# Patient Record
Sex: FEMALE | Race: WHITE | NOT HISPANIC OR LATINO | Employment: OTHER | URBAN - METROPOLITAN AREA
[De-identification: names, ages, dates, MRNs, and addresses within clinical notes are randomized per-mention and may not be internally consistent; named-entity substitution may affect disease eponyms.]

---

## 2023-03-09 ENCOUNTER — OFFICE VISIT (OUTPATIENT)
Dept: FAMILY MEDICINE | Facility: CLINIC | Age: 73
End: 2023-03-09
Payer: MEDICARE

## 2023-03-09 VITALS
HEART RATE: 80 BPM | SYSTOLIC BLOOD PRESSURE: 138 MMHG | WEIGHT: 145.19 LBS | HEIGHT: 64 IN | OXYGEN SATURATION: 96 % | DIASTOLIC BLOOD PRESSURE: 80 MMHG | BODY MASS INDEX: 24.79 KG/M2

## 2023-03-09 DIAGNOSIS — I10 PRIMARY HYPERTENSION: ICD-10-CM

## 2023-03-09 PROCEDURE — 99999 PR PBB SHADOW E&M-NEW PATIENT-LVL III: ICD-10-PCS | Mod: PBBFAC,,, | Performed by: FAMILY MEDICINE

## 2023-03-09 PROCEDURE — 99203 PR OFFICE/OUTPT VISIT, NEW, LEVL III, 30-44 MIN: ICD-10-PCS | Mod: S$PBB,,, | Performed by: FAMILY MEDICINE

## 2023-03-09 PROCEDURE — 99203 OFFICE O/P NEW LOW 30 MIN: CPT | Mod: PBBFAC,PN | Performed by: FAMILY MEDICINE

## 2023-03-09 PROCEDURE — 99999 PR PBB SHADOW E&M-NEW PATIENT-LVL III: CPT | Mod: PBBFAC,,, | Performed by: FAMILY MEDICINE

## 2023-03-09 PROCEDURE — 99203 OFFICE O/P NEW LOW 30 MIN: CPT | Mod: S$PBB,,, | Performed by: FAMILY MEDICINE

## 2023-03-09 RX ORDER — LOSARTAN POTASSIUM 25 MG/1
25 TABLET ORAL DAILY
Qty: 90 TABLET | Refills: 3 | Status: SHIPPED | OUTPATIENT
Start: 2023-03-09 | End: 2024-01-12 | Stop reason: SDUPTHER

## 2023-03-09 RX ORDER — LOSARTAN POTASSIUM 25 MG/1
25 TABLET ORAL DAILY
COMMUNITY
End: 2023-03-09 | Stop reason: SDUPTHER

## 2023-03-09 NOTE — PATIENT INSTRUCTIONS
Doing well today  Filled blood pressure medication today    Follow up 6 months, will get bloodwork at next visit

## 2023-03-09 NOTE — PROGRESS NOTES
"  Family Medicine Note  [facility]    Chief Complaint   Patient presents with    Establish Care        HPI:  72 female here to establish care.  Has white coat syndrome - so BP elevated today    Finds CBD helps both with RA and anxiety    Declines screening parameters for colon and breast    ROS: as above    Vitals:    03/09/23 1359 03/09/23 1427   BP: (!) 145/80 138/80   BP Location: Right arm    Patient Position: Sitting    BP Method: Medium (Manual)    Pulse: 80    SpO2: 96%    Weight: 65.9 kg (145 lb 2.8 oz)    Height: 5' 4" (1.626 m)       Body mass index is 24.92 kg/m².      General:  AOx3, well nourished and developed in no acute distress  Eyes:  PERRLA, EOMI, vision intact grossly  ENT:  normal hearing, moist oral mucosa  Neck:  trachea midline with no masses or thyromegaly  Heart:  RRR, no murmurs.  No edema noted, extremities warm and well perfused  Lungs:  clear to auscultation bilaterally with symmetric chest movement  Abdomen:  Soft, nontender, nondistended.  Normal bowel sounds  Musculoskeletal:  Normal gait.  Normal posture.  Normal muscular development with no joint swelling.  Neurological:  CN II-XII grossly intact. Symmetric strength and sensation  Psych:  Normal mood and affect.  Able to demonstrate good judgement and personal insight.      Assessment/Plan:    Primary hypertension    Other orders  -     losartan (COZAAR) 25 MG tablet; Take 1 tablet (25 mg total) by mouth once daily.  Dispense: 90 tablet; Refill: 3       Filled medication today.  Will get bloodwork at next visit      "

## 2023-03-16 DIAGNOSIS — I10 PRIMARY HYPERTENSION: ICD-10-CM

## 2023-03-17 ENCOUNTER — PATIENT MESSAGE (OUTPATIENT)
Dept: ADMINISTRATIVE | Facility: HOSPITAL | Age: 73
End: 2023-03-17
Payer: MEDICARE

## 2023-08-01 ENCOUNTER — PATIENT MESSAGE (OUTPATIENT)
Dept: ADMINISTRATIVE | Facility: HOSPITAL | Age: 73
End: 2023-08-01
Payer: MEDICARE

## 2023-09-11 ENCOUNTER — LAB VISIT (OUTPATIENT)
Dept: LAB | Facility: HOSPITAL | Age: 73
End: 2023-09-11
Attending: FAMILY MEDICINE
Payer: MEDICARE

## 2023-09-11 DIAGNOSIS — I10 PRIMARY HYPERTENSION: ICD-10-CM

## 2023-09-11 LAB
ALBUMIN SERPL BCP-MCNC: 4 G/DL (ref 3.5–5.2)
ALP SERPL-CCNC: 93 U/L (ref 55–135)
ALT SERPL W/O P-5'-P-CCNC: 34 U/L (ref 10–44)
ANION GAP SERPL CALC-SCNC: 8 MMOL/L (ref 8–16)
AST SERPL-CCNC: 23 U/L (ref 10–40)
BILIRUB SERPL-MCNC: 0.6 MG/DL (ref 0.1–1)
BUN SERPL-MCNC: 9 MG/DL (ref 8–23)
CALCIUM SERPL-MCNC: 9.8 MG/DL (ref 8.7–10.5)
CHLORIDE SERPL-SCNC: 108 MMOL/L (ref 95–110)
CO2 SERPL-SCNC: 26 MMOL/L (ref 23–29)
CREAT SERPL-MCNC: 0.8 MG/DL (ref 0.5–1.4)
EST. GFR  (NO RACE VARIABLE): >60 ML/MIN/1.73 M^2
GLUCOSE SERPL-MCNC: 96 MG/DL (ref 70–110)
POTASSIUM SERPL-SCNC: 3.6 MMOL/L (ref 3.5–5.1)
PROT SERPL-MCNC: 7.5 G/DL (ref 6–8.4)
SODIUM SERPL-SCNC: 142 MMOL/L (ref 136–145)

## 2023-09-11 PROCEDURE — 36415 COLL VENOUS BLD VENIPUNCTURE: CPT | Performed by: FAMILY MEDICINE

## 2023-09-11 PROCEDURE — 80053 COMPREHEN METABOLIC PANEL: CPT | Performed by: FAMILY MEDICINE

## 2023-09-18 ENCOUNTER — OFFICE VISIT (OUTPATIENT)
Dept: FAMILY MEDICINE | Facility: CLINIC | Age: 73
End: 2023-09-18
Payer: MEDICARE

## 2023-09-18 VITALS
HEIGHT: 64 IN | SYSTOLIC BLOOD PRESSURE: 136 MMHG | DIASTOLIC BLOOD PRESSURE: 70 MMHG | HEART RATE: 67 BPM | BODY MASS INDEX: 24.14 KG/M2 | WEIGHT: 141.38 LBS | OXYGEN SATURATION: 98 %

## 2023-09-18 DIAGNOSIS — I10 PRIMARY HYPERTENSION: Primary | ICD-10-CM

## 2023-09-18 PROCEDURE — 99213 OFFICE O/P EST LOW 20 MIN: CPT | Mod: S$GLB,,, | Performed by: FAMILY MEDICINE

## 2023-09-18 PROCEDURE — 99213 PR OFFICE/OUTPT VISIT, EST, LEVL III, 20-29 MIN: ICD-10-PCS | Mod: S$GLB,,, | Performed by: FAMILY MEDICINE

## 2023-09-18 NOTE — PROGRESS NOTES
"    [facility] [place]    Family Medicine Office Visit    Chief Complaint   Patient presents with    Follow-up        HPI:  follow up chronic conditions:    Blood Pressure at goal on medication, with patient reporting prescription compliance  As documented last visit, declines both colon and breast cancer screening    Is dealing with loss of  (49 year marriage)    ROS: as above    Vitals:    09/18/23 1154   BP: 136/70   Pulse: 67   SpO2: 98%   Weight: 64.1 kg (141 lb 6.4 oz)   Height: 5' 4" (1.626 m)      Body mass index is 24.27 kg/m².      General:  AOx3, well nourished and developed in no acute distress  Eyes:  PERRLA, EOMI, vision intact grossly  ENT:  normal hearing, moist oral mucosa  Neck:  trachea midline with no masses or thyromegaly  Heart:  RRR, no murmurs.  No edema noted, extremities warm and well perfused  Lungs:  clear to auscultation bilaterally with symmetric chest movement  Abdomen:  Soft, nontender, nondistended.  Normal bowel sounds  Musculoskeletal:  Normal gait.  Normal posture.  Normal muscular development with no joint swelling.  Neurological:  CN II-XII grossly intact. Symmetric strength and sensation  Psych:  Normal mood and affect.  Able to demonstrate good judgement and personal insight.      Assessment/Plan:    1. Primary hypertension       At goal.  Follow up 6 months.      "

## 2023-09-21 DIAGNOSIS — Z78.0 MENOPAUSE: ICD-10-CM

## 2023-10-24 ENCOUNTER — PATIENT MESSAGE (OUTPATIENT)
Dept: ADMINISTRATIVE | Facility: HOSPITAL | Age: 73
End: 2023-10-24
Payer: MEDICARE

## 2024-01-05 ENCOUNTER — PATIENT MESSAGE (OUTPATIENT)
Dept: ADMINISTRATIVE | Facility: HOSPITAL | Age: 74
End: 2024-01-05
Payer: MEDICARE

## 2024-01-12 ENCOUNTER — LAB VISIT (OUTPATIENT)
Dept: LAB | Facility: CLINIC | Age: 74
End: 2024-01-12
Payer: MEDICARE

## 2024-01-12 ENCOUNTER — OFFICE VISIT (OUTPATIENT)
Dept: FAMILY MEDICINE | Facility: CLINIC | Age: 74
End: 2024-01-12
Payer: MEDICARE

## 2024-01-12 VITALS
HEART RATE: 78 BPM | WEIGHT: 145.5 LBS | DIASTOLIC BLOOD PRESSURE: 90 MMHG | OXYGEN SATURATION: 97 % | BODY MASS INDEX: 25.78 KG/M2 | SYSTOLIC BLOOD PRESSURE: 170 MMHG | HEIGHT: 63 IN

## 2024-01-12 DIAGNOSIS — E78.2 MIXED HYPERLIPIDEMIA: ICD-10-CM

## 2024-01-12 DIAGNOSIS — I10 ESSENTIAL HYPERTENSION, BENIGN: ICD-10-CM

## 2024-01-12 DIAGNOSIS — R73.9 ELEVATED BLOOD SUGAR: ICD-10-CM

## 2024-01-12 DIAGNOSIS — R53.83 FATIGUE, UNSPECIFIED TYPE: ICD-10-CM

## 2024-01-12 DIAGNOSIS — M06.09 RHEUMATOID ARTHRITIS OF MULTIPLE SITES WITH NEGATIVE RHEUMATOID FACTOR: ICD-10-CM

## 2024-01-12 DIAGNOSIS — M06.9 RHEUMATOID ARTHRITIS, INVOLVING UNSPECIFIED SITE, UNSPECIFIED WHETHER RHEUMATOID FACTOR PRESENT: ICD-10-CM

## 2024-01-12 DIAGNOSIS — F32.4 MAJOR DEPRESSIVE DISORDER WITH SINGLE EPISODE, IN PARTIAL REMISSION: ICD-10-CM

## 2024-01-12 DIAGNOSIS — M06.9 RHEUMATOID ARTHRITIS, INVOLVING UNSPECIFIED SITE, UNSPECIFIED WHETHER RHEUMATOID FACTOR PRESENT: Primary | ICD-10-CM

## 2024-01-12 LAB
ALBUMIN SERPL BCP-MCNC: 3.9 G/DL (ref 3.5–5.2)
ALBUMIN/CREAT UR: ABNORMAL UG/MG (ref 0–30)
ALP SERPL-CCNC: 106 U/L (ref 55–135)
ALT SERPL W/O P-5'-P-CCNC: 29 U/L (ref 10–44)
ANION GAP SERPL CALC-SCNC: 9 MMOL/L (ref 8–16)
AST SERPL-CCNC: 23 U/L (ref 10–40)
BILIRUB SERPL-MCNC: 0.6 MG/DL (ref 0.1–1)
BUN SERPL-MCNC: 9 MG/DL (ref 8–23)
CALCIUM SERPL-MCNC: 9.6 MG/DL (ref 8.7–10.5)
CHLORIDE SERPL-SCNC: 106 MMOL/L (ref 95–110)
CHOLEST SERPL-MCNC: 186 MG/DL (ref 120–199)
CHOLEST/HDLC SERPL: 4 {RATIO} (ref 2–5)
CO2 SERPL-SCNC: 25 MMOL/L (ref 23–29)
CREAT SERPL-MCNC: 0.8 MG/DL (ref 0.5–1.4)
CREAT UR-MCNC: 12 MG/DL (ref 15–325)
EST. GFR  (NO RACE VARIABLE): >60 ML/MIN/1.73 M^2
ESTIMATED AVG GLUCOSE: 103 MG/DL (ref 68–131)
GLUCOSE SERPL-MCNC: 96 MG/DL (ref 70–110)
HBA1C MFR BLD: 5.2 % (ref 4–5.6)
HDLC SERPL-MCNC: 46 MG/DL (ref 40–75)
HDLC SERPL: 24.7 % (ref 20–50)
LDLC SERPL CALC-MCNC: 121 MG/DL (ref 63–159)
MICROALBUMIN UR DL<=1MG/L-MCNC: <5 UG/ML
NONHDLC SERPL-MCNC: 140 MG/DL
POTASSIUM SERPL-SCNC: 4 MMOL/L (ref 3.5–5.1)
PROT SERPL-MCNC: 7.4 G/DL (ref 6–8.4)
RHEUMATOID FACT SERPL-ACNC: 42 IU/ML (ref 0–15)
SODIUM SERPL-SCNC: 140 MMOL/L (ref 136–145)
TRIGL SERPL-MCNC: 95 MG/DL (ref 30–150)
TSH SERPL DL<=0.005 MIU/L-ACNC: 1.28 UIU/ML (ref 0.4–4)

## 2024-01-12 PROCEDURE — 99214 OFFICE O/P EST MOD 30 MIN: CPT | Mod: S$GLB,,, | Performed by: INTERNAL MEDICINE

## 2024-01-12 PROCEDURE — 86235 NUCLEAR ANTIGEN ANTIBODY: CPT | Mod: 59 | Performed by: INTERNAL MEDICINE

## 2024-01-12 PROCEDURE — 82043 UR ALBUMIN QUANTITATIVE: CPT | Performed by: INTERNAL MEDICINE

## 2024-01-12 PROCEDURE — 83036 HEMOGLOBIN GLYCOSYLATED A1C: CPT | Performed by: INTERNAL MEDICINE

## 2024-01-12 PROCEDURE — 80053 COMPREHEN METABOLIC PANEL: CPT | Performed by: INTERNAL MEDICINE

## 2024-01-12 PROCEDURE — 86039 ANTINUCLEAR ANTIBODIES (ANA): CPT | Performed by: INTERNAL MEDICINE

## 2024-01-12 PROCEDURE — 36415 COLL VENOUS BLD VENIPUNCTURE: CPT | Mod: ,,, | Performed by: INTERNAL MEDICINE

## 2024-01-12 PROCEDURE — 84443 ASSAY THYROID STIM HORMONE: CPT | Performed by: INTERNAL MEDICINE

## 2024-01-12 PROCEDURE — 86038 ANTINUCLEAR ANTIBODIES: CPT | Performed by: INTERNAL MEDICINE

## 2024-01-12 PROCEDURE — 86431 RHEUMATOID FACTOR QUANT: CPT | Performed by: INTERNAL MEDICINE

## 2024-01-12 PROCEDURE — 80061 LIPID PANEL: CPT | Performed by: INTERNAL MEDICINE

## 2024-01-12 RX ORDER — LOSARTAN POTASSIUM 25 MG/1
25 TABLET ORAL 2 TIMES DAILY
Qty: 180 TABLET | Refills: 3 | Status: SHIPPED | OUTPATIENT
Start: 2024-01-12 | End: 2025-01-11

## 2024-01-12 RX ORDER — MELOXICAM 15 MG/1
15 TABLET ORAL
Qty: 30 TABLET | Refills: 2 | Status: SHIPPED | OUTPATIENT
Start: 2024-01-12 | End: 2024-04-11

## 2024-01-12 NOTE — PROGRESS NOTES
Subjective:       Patient ID: Shell Lopez is a 73 y.o. female.    Chief Complaint: Hip Pain (Patient is here for left  hip pain. ) and Hypertension      Patient is established already .......... presents today for a f/u visit , to discuss getting new labs and for management of the chronic conditions aminata BP , joint pains , RA        Hip Pain   The incident occurred more than 1 week ago. The incident occurred at home. There was no injury mechanism. The pain is present in the left hip. The quality of the pain is described as aching. The pain is moderate. The pain has been Fluctuating since onset. Pertinent negatives include no inability to bear weight, loss of motion, loss of sensation, muscle weakness, numbness or tingling. She reports no foreign bodies present. The symptoms are aggravated by movement and weight bearing. She has tried acetaminophen (CBD) for the symptoms. The treatment provided mild relief.   Hypertension      Review of Systems   Constitutional:  Negative for activity change, fever and unexpected weight change.   Respiratory: Negative.     Cardiovascular: Negative.    Musculoskeletal:  Positive for arthralgias and leg pain.   Neurological: Negative.  Negative for tingling and numbness.         Objective:      Physical Exam  Vitals and nursing note reviewed.   Constitutional:       Appearance: Normal appearance.   Cardiovascular:      Rate and Rhythm: Normal rate and regular rhythm.   Pulmonary:      Effort: Pulmonary effort is normal.      Breath sounds: Normal breath sounds.   Musculoskeletal:         General: Normal range of motion.      Cervical back: Normal range of motion and neck supple.   Skin:     General: Skin is warm.   Neurological:      General: No focal deficit present.      Mental Status: She is alert.         Assessment:       1. Rheumatoid arthritis, involving unspecified site, unspecified whether rheumatoid factor present  Overview:  With inc pain in L hip for 2  weeks      Assessment & Plan:  Get ZACHERY , RF  T/c adding NSAIDs    Orders:  -     Rheumatoid Factor; Future; Expected date: 01/12/2024  -     ZACHERY Screen w/Reflex; Future; Expected date: 01/12/2024    2. Essential hypertension, benign  Overview:  Elevated today  Sec to hip pains  Sec to anxiety after losing her spouse last May    Assessment & Plan:  Diet d/w pt  Inc losartan to 25mg bid  Bring monitor to next clement    Orders:  -     Comprehensive Metabolic Panel; Future; Expected date: 01/12/2024  -     Microalbumin/Creatinine Ratio, Urine; Future; Expected date: 01/12/2024  -     TSH; Future; Expected date: 01/12/2024    3. Mixed hyperlipidemia  -     Lipid Panel; Future; Expected date: 01/12/2024    4. Elevated blood sugar  -     Hemoglobin A1C; Standing    5. Fatigue, unspecified type  -     TSH; Future; Expected date: 01/12/2024    6. Rheumatoid arthritis of multiple sites with negative rheumatoid factor  Overview:  With inc pain in L hip for 2 weeks      Assessment & Plan:  Get ZACHERY , RF  T/c adding NSAIDs      Other orders  -     losartan (COZAAR) 25 MG tablet; Take 1 tablet (25 mg total) by mouth 2 (two) times a day.  Dispense: 180 tablet; Refill: 3  -     meloxicam (MOBIC) 15 MG tablet; Take 1 tablet (15 mg total) by mouth as needed for Pain.  Dispense: 30 tablet; Refill: 2           Plan:       1. Rheumatoid arthritis, involving unspecified site, unspecified whether rheumatoid factor present  Overview:  With inc pain in L hip for 2 weeks      Assessment & Plan:  Get ZACHERY , RF  T/c adding NSAIDs    Orders:  -     Rheumatoid Factor; Future; Expected date: 01/12/2024  -     ZACHERY Screen w/Reflex; Future; Expected date: 01/12/2024    2. Essential hypertension, benign  Overview:  Elevated today  Sec to hip pains  Sec to anxiety after losing her spouse last May    Assessment & Plan:  Diet d/w pt  Inc losartan to 25mg bid  Bring monitor to next clement    Orders:  -     Comprehensive Metabolic Panel; Future; Expected date:  01/12/2024  -     Microalbumin/Creatinine Ratio, Urine; Future; Expected date: 01/12/2024  -     TSH; Future; Expected date: 01/12/2024    3. Mixed hyperlipidemia  -     Lipid Panel; Future; Expected date: 01/12/2024    4. Elevated blood sugar  -     Hemoglobin A1C; Standing    5. Fatigue, unspecified type  -     TSH; Future; Expected date: 01/12/2024    6. Rheumatoid arthritis of multiple sites with negative rheumatoid factor  Overview:  With inc pain in L hip for 2 weeks      Assessment & Plan:  Get ZACHERY , RF  T/c adding NSAIDs      Other orders  -     losartan (COZAAR) 25 MG tablet; Take 1 tablet (25 mg total) by mouth 2 (two) times a day.  Dispense: 180 tablet; Refill: 3  -     meloxicam (MOBIC) 15 MG tablet; Take 1 tablet (15 mg total) by mouth as needed for Pain.  Dispense: 30 tablet; Refill: 2        1. Rheumatoid arthritis, involving unspecified site, unspecified whether rheumatoid factor present  Overview:  With inc pain in L hip for 2 weeks      Assessment & Plan:  Get ZACHERY , RF  T/c adding NSAIDs    Orders:  -     Rheumatoid Factor; Future; Expected date: 01/12/2024  -     ZACHERY Screen w/Reflex; Future; Expected date: 01/12/2024    2. Essential hypertension, benign  Overview:  Elevated today  Sec to hip pains  Sec to anxiety after losing her spouse last May    Assessment & Plan:  Diet d/w pt  Inc losartan to 25mg bid  Bring monitor to next clement    Orders:  -     Comprehensive Metabolic Panel; Future; Expected date: 01/12/2024  -     Microalbumin/Creatinine Ratio, Urine; Future; Expected date: 01/12/2024  -     TSH; Future; Expected date: 01/12/2024    3. Mixed hyperlipidemia  -     Lipid Panel; Future; Expected date: 01/12/2024    4. Elevated blood sugar  -     Hemoglobin A1C; Standing    5. Fatigue, unspecified type  -     TSH; Future; Expected date: 01/12/2024    6. Rheumatoid arthritis of multiple sites with negative rheumatoid factor  Overview:  With inc pain in L hip for 2 weeks      Assessment &  Plan:  Get ZACHERY , RF  T/c adding NSAIDs      7. Major depressive disorder with single episode, in partial remission  Overview:  Stable  Sec to spouse passing away in 23'    Assessment & Plan:  Monitor  T/c adding an SSRI next       Other orders  -     losartan (COZAAR) 25 MG tablet; Take 1 tablet (25 mg total) by mouth 2 (two) times a day.  Dispense: 180 tablet; Refill: 3  -     meloxicam (MOBIC) 15 MG tablet; Take 1 tablet (15 mg total) by mouth as needed for Pain.  Dispense: 30 tablet; Refill: 2

## 2024-01-16 LAB
ANA PATTERN 1: NORMAL
ANA SER QL IF: POSITIVE
ANA TITR SER IF: NORMAL {TITER}

## 2024-01-18 LAB
ANTI SM ANTIBODY: 0.09 RATIO (ref 0–0.99)
ANTI SM/RNP ANTIBODY: 0.09 RATIO (ref 0–0.99)
ANTI-SM INTERPRETATION: NEGATIVE
ANTI-SM/RNP INTERPRETATION: NEGATIVE
ANTI-SSA ANTIBODY: 0.07 RATIO (ref 0–0.99)
ANTI-SSA INTERPRETATION: NEGATIVE
ANTI-SSB ANTIBODY: 0.07 RATIO (ref 0–0.99)
ANTI-SSB INTERPRETATION: NEGATIVE
DSDNA AB SER-ACNC: NORMAL [IU]/ML

## 2024-02-06 DIAGNOSIS — Z12.11 COLON CANCER SCREENING: ICD-10-CM

## 2024-02-09 ENCOUNTER — OFFICE VISIT (OUTPATIENT)
Dept: FAMILY MEDICINE | Facility: CLINIC | Age: 74
End: 2024-02-09
Payer: MEDICARE

## 2024-02-09 VITALS
OXYGEN SATURATION: 98 % | HEART RATE: 77 BPM | WEIGHT: 140 LBS | DIASTOLIC BLOOD PRESSURE: 81 MMHG | SYSTOLIC BLOOD PRESSURE: 141 MMHG | HEIGHT: 63 IN | BODY MASS INDEX: 24.8 KG/M2

## 2024-02-09 DIAGNOSIS — M06.09 RHEUMATOID ARTHRITIS OF MULTIPLE SITES WITH NEGATIVE RHEUMATOID FACTOR: ICD-10-CM

## 2024-02-09 DIAGNOSIS — F32.4 MAJOR DEPRESSIVE DISORDER WITH SINGLE EPISODE, IN PARTIAL REMISSION: ICD-10-CM

## 2024-02-09 DIAGNOSIS — R76.8 ANA POSITIVE: Primary | ICD-10-CM

## 2024-02-09 PROCEDURE — 99213 OFFICE O/P EST LOW 20 MIN: CPT | Mod: S$GLB,,, | Performed by: INTERNAL MEDICINE

## 2024-02-09 RX ORDER — ESCITALOPRAM OXALATE 5 MG/1
5 TABLET ORAL DAILY
Qty: 30 TABLET | Refills: 2 | Status: SHIPPED | OUTPATIENT
Start: 2024-02-09 | End: 2024-03-18

## 2024-02-09 NOTE — PROGRESS NOTES
Subjective:       Patient ID: Shell Lopez is a 73 y.o. female.    Chief Complaint: Follow-up (Follow up medication concern) and Medication Problem      Patient is established already .......... presents today for a f/u visit , to discuss labs results and for management of the chronic conditions.        Follow-up  Pertinent negatives include no fever.   Depression  Visit Type: follow-up  Patient presents with the following symptoms: depressed mood, insomnia and restlessness.  Patient is not experiencing: suicidal ideas, suicidal planning, thoughts of death, weight gain and weight loss.  Frequency of symptoms: most days   Severity: moderate   Sleep quality: fair      Review of Systems   Constitutional:  Negative for activity change, fever, unexpected weight change, weight gain and weight loss.   Respiratory: Negative.     Cardiovascular: Negative.    Neurological: Negative.    Psychiatric/Behavioral:  Positive for depression. Negative for suicidal ideas. The patient has insomnia.          Objective:      Physical Exam  Vitals and nursing note reviewed.   Constitutional:       Appearance: Normal appearance.   Cardiovascular:      Rate and Rhythm: Normal rate and regular rhythm.   Pulmonary:      Effort: Pulmonary effort is normal.      Breath sounds: Normal breath sounds.   Musculoskeletal:      Cervical back: Normal range of motion and neck supple.   Neurological:      Mental Status: She is alert.   Psychiatric:         Mood and Affect: Mood normal.         Assessment:       1. ZACHERY positive  -     Ambulatory referral/consult to Rheumatology; Future; Expected date: 02/16/2024    2. Rheumatoid arthritis of multiple sites with negative rheumatoid factor  Overview:  With inc pain in L hip for 2 weeks  + RF , ZACHERY  Off mobic b/o glaucoma      Assessment & Plan:  Refer to Rheum      3. Major depressive disorder with single episode, in partial remission  Overview:  Stable  Sec to spouse passing away in 23'    Assessment &  Plan:  Start low dose lexapro  No SI  F/u in 2M      Other orders  -     EScitalopram oxalate (LEXAPRO) 5 MG Tab; Take 1 tablet (5 mg total) by mouth once daily.  Dispense: 30 tablet; Refill: 2           Plan:       1. ZACHERY positive  -     Ambulatory referral/consult to Rheumatology; Future; Expected date: 02/16/2024    2. Rheumatoid arthritis of multiple sites with negative rheumatoid factor  Overview:  With inc pain in L hip for 2 weeks  + RF , ZACHERY  Off mobic b/o glaucoma      Assessment & Plan:  Refer to Rheum      3. Major depressive disorder with single episode, in partial remission  Overview:  Stable  Sec to spouse passing away in 23'    Assessment & Plan:  Start low dose lexapro  No SI  F/u in 2M      Other orders  -     EScitalopram oxalate (LEXAPRO) 5 MG Tab; Take 1 tablet (5 mg total) by mouth once daily.  Dispense: 30 tablet; Refill: 2

## 2024-02-27 ENCOUNTER — TELEPHONE (OUTPATIENT)
Dept: RHEUMATOLOGY | Facility: CLINIC | Age: 74
End: 2024-02-27
Payer: MEDICARE

## 2024-02-27 NOTE — TELEPHONE ENCOUNTER
----- Message from Jolly Page sent at 2/22/2024 10:31 AM CST -----  Regarding: appointment  Contact: patient  Type:  Sooner Appointment Request    Caller is requesting a sooner appointment.  Caller declined first available appointment listed below.  Caller will not accept being placed on the waitlist and is requesting a message be sent to doctor.    Name of Caller:  patient  When is the first available appointment?    Symptoms:  RA  Would the patient rather a call back or a response via [facility]? call  Best Call Back Number:  [phone] (home)     Additional Information:  Please call patient to advise.  Thanks!

## 2024-03-05 ENCOUNTER — OFFICE VISIT (OUTPATIENT)
Dept: RHEUMATOLOGY | Facility: CLINIC | Age: 74
End: 2024-03-05
Payer: MEDICARE

## 2024-03-05 VITALS
SYSTOLIC BLOOD PRESSURE: 182 MMHG | HEART RATE: 69 BPM | DIASTOLIC BLOOD PRESSURE: 91 MMHG | BODY MASS INDEX: 24.61 KG/M2 | HEIGHT: 63 IN | WEIGHT: 138.88 LBS

## 2024-03-05 DIAGNOSIS — M15.9 PRIMARY OSTEOARTHRITIS INVOLVING MULTIPLE JOINTS: ICD-10-CM

## 2024-03-05 DIAGNOSIS — F32.4 MAJOR DEPRESSIVE DISORDER WITH SINGLE EPISODE, IN PARTIAL REMISSION: ICD-10-CM

## 2024-03-05 DIAGNOSIS — R76.8 ANA POSITIVE: ICD-10-CM

## 2024-03-05 DIAGNOSIS — M13.0 POLYARTHRITIS: Primary | ICD-10-CM

## 2024-03-05 PROCEDURE — 99204 OFFICE O/P NEW MOD 45 MIN: CPT | Mod: S$PBB,,, | Performed by: INTERNAL MEDICINE

## 2024-03-05 PROCEDURE — 99999 PR PBB SHADOW E&M-EST. PATIENT-LVL III: CPT | Mod: PBBFAC,,, | Performed by: INTERNAL MEDICINE

## 2024-03-05 PROCEDURE — 99213 OFFICE O/P EST LOW 20 MIN: CPT | Mod: PBBFAC | Performed by: INTERNAL MEDICINE

## 2024-03-05 RX ORDER — TIZANIDINE 2 MG/1
2 TABLET ORAL NIGHTLY
Qty: 30 TABLET | Refills: 4 | Status: SHIPPED | OUTPATIENT
Start: 2024-03-05

## 2024-03-05 NOTE — PROGRESS NOTES
3/4/2024    10:22 AM   Rapid3 Question Responses and Scores   MDHAQ Score 0.3   Psychologic Score 3.3   Pain Score 5   When you awakened in the morning OVER THE LAST WEEK, did you feel stiff? Yes   If Yes, please indicate the number of hours until you are as limber as you will be for the day 2   Fatigue Score 5   Global Health Score 5   RAPID3 Score 3.67     Answers submitted by the patient for this visit:  Rheumatology Questionnaire (Submitted on 3/4/2024)  fever: No  eye redness: No  mouth sores: No  headaches: No  shortness of breath: No  chest pain: No  trouble swallowing: No  diarrhea: No  constipation: No  unexpected weight change: No  genital sore: No  dysuria: No  During the last 3 days, have you had a skin rash?: No  Bruises or bleeds easily: No  cough: No

## 2024-03-06 ENCOUNTER — HOSPITAL ENCOUNTER (OUTPATIENT)
Dept: RADIOLOGY | Facility: HOSPITAL | Age: 74
Discharge: HOME | End: 2024-03-06
Attending: INTERNAL MEDICINE
Payer: MEDICARE

## 2024-03-06 DIAGNOSIS — M13.0 POLYARTHRITIS: ICD-10-CM

## 2024-03-06 DIAGNOSIS — R76.8 ANA POSITIVE: ICD-10-CM

## 2024-03-06 PROCEDURE — 73130 X-RAY EXAM OF HAND: CPT | Mod: TC,50

## 2024-03-06 PROCEDURE — 73030 X-RAY EXAM OF SHOULDER: CPT | Mod: TC,50

## 2024-03-06 PROCEDURE — 73130 X-RAY EXAM OF HAND: CPT | Mod: 26,50,, | Performed by: RADIOLOGY

## 2024-03-06 PROCEDURE — 73030 X-RAY EXAM OF SHOULDER: CPT | Mod: 26,50,, | Performed by: RADIOLOGY

## 2024-03-06 NOTE — PROGRESS NOTES
History of present illness:  73-year-old female who reports she had a positive rheumatoid factor test 10 years ago.  She is uncertain why the test was done.  She really was not having joint problems.  She was placed on no medication.  Her main problem since it has been glaucoma.  She had surgery 18 months ago and developed an infection.  She had been diagnosed as having irritable OCI Flash is but this is probably due to the surgery.  She did fracture her right wrist 2 years ago and underwent surgery.  She has had some swelling in that wrists since the surgery.    Her   in May.  Since then she has been under increased stress.  She complains of diffuse aching since December.  It started initially in the left leg.  It now spread to the shoulders.  She has had problems in her hands with some swelling in the PIP.  There is no erythema or increased warmth accompanying the swelling.  The pain is worse at night.  She is 2 hours of morning stiffness.  It is better during the day but then gets worse at the end of the day.  She denies any similar pain in the past.  She had had a prior injury to the right shoulder.    She was placed on meloxicam but had side effects.  She has been using Arthritis Tylenol up to 6 daily, with some relief.  Topicals have helped.  Heat helps.    No fever, headache, rash, conjunctivitis, oral ulcers, dry eyes or mouth, Raynaud's phenomenon, pleurisy, chronic or bloody diarrhea, vaginal or urethral discharge or ulcer, numbness or tingling, blood clots or phlebitis.  She has had no amaurosis or jaw claudication.  She is a  2 para 2.  She has no known family history of arthritis.      Systems review:  General:  Weight has decreased 10 lb   Respiratory:  No chronic cough or sputum production.  No shortness of breath.    GI:  No abdominal pain or peptic ulcer disease.  No liver problems.    :  No kidney or bladder problems     Physical examination:  Skin:  No rashes   ENT:  Adequate  tears in saliva.  No conjunctivitis or oral ulcers.  Temporal artery pulsations are normal.  Chest: Clear to auscultation  Cardiac:  No murmurs, gallops, rubs   Abdomen:  No organomegaly or masses.  No tenderness to palpation   Extremities:  No pedal edema   Musculoskeletal:  Cervical spine is unremarkable.  She has pain on range of motion of the shoulders bilaterally but has good range of motion.  She is tender in the right supraspinatus area.  She has no swelling in the shoulders.    She has pain on range of motion of the right elbow.  She has no tenderness or swelling.    She has a synovial cyst on the extensor tendon at the right wrist.  She has no synovitis in either wrists.  She has no tenderness to palpation.  She is bony hypertrophy of the 1st CMC, PIP, and D IP bilaterally.  She does have tenderness of the PIP, especially the 3rd.  There may be some mild soft tissue swelling but no erythema or increased warmth.  Lumbar spine has good range of motion without pain on range of motion.    Hips have full range of motion without pain on range of motion.    She is bony hypertrophy of the left knee.  She has no effusion.  It is not tender to palpation.  Right knee is unremarkable.    Ankles and small joints the feet are unremarkable.    Laboratory:  Rheumatoid factor is slightly elevated at 42.  ZACHERY is positive 1:160, speckled pattern with a negative ZACHERY profile.    Assessment:  1. Clinically she only has evidence of osteoarthritis  2. She has 2 positive immunologic tests but they may be false-positive tests.  The fact that her rheumatoid factor has been positive 10 years ago, it does usually not returned negative.    Plans:  1.  I have ordered further laboratory tests and x-rays.  She will have it done tomorrow.  2.  I placed her on tizanidine 2 mg at bedtime to help her nocturnal pain   3.  Continue Tylenol as before  4.  Return in 2 months        Answers submitted by the patient for this visit:  Rheumatology  Questionnaire (Submitted on 3/4/2024)  fever: No  eye redness: No  mouth sores: No  headaches: No  shortness of breath: No  chest pain: No  trouble swallowing: No  diarrhea: No  constipation: No  unexpected weight change: No  genital sore: No  dysuria: No  During the last 3 days, have you had a skin rash?: No  Bruises or bleeds easily: No  cough: No

## 2024-03-07 ENCOUNTER — TELEPHONE (OUTPATIENT)
Dept: RHEUMATOLOGY | Facility: CLINIC | Age: 74
End: 2024-03-07

## 2024-03-07 DIAGNOSIS — M13.0 POLYARTHRITIS: Primary | ICD-10-CM

## 2024-03-12 ENCOUNTER — PATIENT OUTREACH (OUTPATIENT)
Dept: ADMINISTRATIVE | Facility: HOSPITAL | Age: 74
End: 2024-03-12
Payer: MEDICARE

## 2024-03-12 NOTE — PROGRESS NOTES
Non-compliant report chart audits for CMS/[facility], FLU VACCINE Chart review completed for  test overdue (mammograms, Colonoscopies, pap smears, DM labs, and/or EYE EXAMs)      Care Everywhere and media, updates requested and reviewed.      Last flu shot NEVER DONE      If the patient does not wish to have a Flu vaccine, please document as follows-per MEDICARE guidelines:

## 2024-03-18 ENCOUNTER — OFFICE VISIT (OUTPATIENT)
Dept: FAMILY MEDICINE | Facility: CLINIC | Age: 74
End: 2024-03-18
Payer: MEDICARE

## 2024-03-18 VITALS
SYSTOLIC BLOOD PRESSURE: 132 MMHG | HEIGHT: 63 IN | WEIGHT: 133.5 LBS | OXYGEN SATURATION: 94 % | HEART RATE: 74 BPM | BODY MASS INDEX: 23.65 KG/M2 | DIASTOLIC BLOOD PRESSURE: 80 MMHG

## 2024-03-18 DIAGNOSIS — M06.09 RHEUMATOID ARTHRITIS OF MULTIPLE SITES WITH NEGATIVE RHEUMATOID FACTOR: Primary | ICD-10-CM

## 2024-03-18 DIAGNOSIS — I10 ESSENTIAL HYPERTENSION, BENIGN: ICD-10-CM

## 2024-03-18 DIAGNOSIS — Z53.20 MAMMOGRAM DECLINED: ICD-10-CM

## 2024-03-18 DIAGNOSIS — M67.40 GANGLION CYST: ICD-10-CM

## 2024-03-18 DIAGNOSIS — F32.4 MAJOR DEPRESSIVE DISORDER WITH SINGLE EPISODE, IN PARTIAL REMISSION: ICD-10-CM

## 2024-03-18 PROCEDURE — 99214 OFFICE O/P EST MOD 30 MIN: CPT | Mod: S$GLB,,, | Performed by: FAMILY MEDICINE

## 2024-03-18 RX ORDER — PREDNISONE 10 MG/1
10 TABLET ORAL 2 TIMES DAILY
Qty: 20 TABLET | Refills: 0 | Status: SHIPPED | OUTPATIENT
Start: 2024-03-18 | End: 2024-03-28

## 2024-03-18 RX ORDER — METHYLPREDNISOLONE 4 MG/1
TABLET ORAL
COMMUNITY
Start: 2024-02-13

## 2024-03-18 NOTE — PATIENT INSTRUCTIONS
Will send referral to Dr. Kenny for wrist  Start steroids as we need for joint pain    Follow up 6 months    Send message when you need refill on steroids

## 2024-03-18 NOTE — PROGRESS NOTES
"    [facility] [place], [place]    Family Medicine Office Visit    Chief Complaint   Patient presents with    Follow-up     6 month f/u         HPI:  follow up chronic conditions:    Blood Pressure at goal on medication, with patient reporting prescription compliance  RA stable with rheumatology followup - had flair up of this over the holidays  Mood/anxiety stable, but difficult over holidays given loss of  May 2023    NSAIDs cause some ocular distress (glaucoma), so this therapy wasn't helpful for RA.  Muscle relaxer neither.  Saw Rheum in [place] - anti CCP, CRP, ESR all elevated    Has ganglion cyst R wrist - painful.  Had surgery in past - Dr. Kenny    ROS: as above    Vitals:    03/18/24 1054   BP: 132/80   BP Location: Right arm   Patient Position: Sitting   BP Method: Large (Manual)   Pulse: 74   SpO2: (!) 94%   Weight: 60.6 kg (133 lb 8 oz)   Height: 5' 3" (1.6 m)      Body mass index is 23.65 kg/m².      General:  AOx3, well nourished and developed in no acute distress  Eyes:  PERRLA, EOMI, vision intact grossly  ENT:  normal hearing, moist oral mucosa  Neck:  trachea midline with no masses or thyromegaly  Heart:  RRR, no murmurs.  No edema noted, extremities warm and well perfused  Lungs:  clear to auscultation bilaterally with symmetric chest movement  Abdomen:  Soft, nontender, nondistended.  Normal bowel sounds  Musculoskeletal:  Normal gait.  Normal posture.  Normal muscular development with no joint swelling.  Ganglion cyst R wrist  Neurological:  CN II-XII grossly intact. Symmetric strength and sensation  Psych:  Normal mood and affect.  Able to demonstrate good judgement and personal insight.      Assessment/Plan:    1. Rheumatoid arthritis of multiple sites with negative rheumatoid factor  Overview:  With inc pain in L hip for 2 weeks  + RF , ZACHERY  Off mobic b/o glaucoma        2. Essential hypertension, benign  Overview:  Elevated today  Sec to hip pains  Sec to " anxiety after losing her spouse last May      3. Major depressive disorder with single episode, in partial remission  Overview:  Stable  Sec to spouse passing away in 23'      4. Ganglion cyst  -     Ambulatory referral/consult to Orthopedics; Future; Expected date: 03/25/2024    5. Mammogram declined         No reason we can't use PRN steroids for rheumatic joint pain  At goal  Stable   Referral to Dr. Efraín Forde  Patient declines this

## 2024-03-20 ENCOUNTER — PATIENT MESSAGE (OUTPATIENT)
Dept: ADMINISTRATIVE | Facility: HOSPITAL | Age: 74
End: 2024-03-20
Payer: MEDICARE

## 2024-04-03 ENCOUNTER — PATIENT MESSAGE (OUTPATIENT)
Dept: ADMINISTRATIVE | Facility: HOSPITAL | Age: 74
End: 2024-04-03
Payer: MEDICARE

## 2024-04-03 ENCOUNTER — TELEPHONE (OUTPATIENT)
Dept: FAMILY MEDICINE | Facility: CLINIC | Age: 74
End: 2024-04-03
Payer: MEDICARE

## 2024-04-03 NOTE — TELEPHONE ENCOUNTER
----- Message from Courtney Duff sent at 4/3/2024 10:56 AM CDT -----  Contact: PT  Type: Needs Medical Advice    Who Called: PT  Best Call Back Number: [phone]  Additional  Information: PT asking to get referral for AMB REFERRAL/CONSULT TO ORTHOPEDICS, faxed to Dr. Seven Kenny, FAX# [phone].  Please Advise- Thank you

## 2024-04-30 ENCOUNTER — PATIENT MESSAGE (OUTPATIENT)
Dept: FAMILY MEDICINE | Facility: CLINIC | Age: 74
End: 2024-04-30
Payer: MEDICARE

## 2024-05-03 ENCOUNTER — PATIENT MESSAGE (OUTPATIENT)
Dept: ADMINISTRATIVE | Facility: HOSPITAL | Age: 74
End: 2024-05-03
Payer: MEDICARE

## 2024-05-17 ENCOUNTER — PATIENT MESSAGE (OUTPATIENT)
Dept: FAMILY MEDICINE | Facility: CLINIC | Age: 74
End: 2024-05-17
Payer: MEDICARE

## 2024-05-17 DIAGNOSIS — M06.09 RHEUMATOID ARTHRITIS OF MULTIPLE SITES WITH NEGATIVE RHEUMATOID FACTOR: Primary | ICD-10-CM

## 2024-05-20 RX ORDER — PREDNISONE 10 MG/1
10 TABLET ORAL 2 TIMES DAILY
Qty: 14 TABLET | Refills: 0 | Status: SHIPPED | OUTPATIENT
Start: 2024-05-20 | End: 2024-05-27

## 2024-06-06 ENCOUNTER — PATIENT MESSAGE (OUTPATIENT)
Dept: ADMINISTRATIVE | Facility: HOSPITAL | Age: 74
End: 2024-06-06
Payer: MEDICARE

## 2024-06-10 ENCOUNTER — TELEPHONE (OUTPATIENT)
Dept: FAMILY MEDICINE | Facility: CLINIC | Age: 74
End: 2024-06-10
Payer: MEDICARE

## 2024-06-10 NOTE — TELEPHONE ENCOUNTER
Melody Jacobs,  Please review message below from this patient concerning Rx request for a TENS Unit.  Call placed to pt due to msg left, spoke w/pt requesting a Rx sent to [facility] for wireless TENS unit for hands due to arthritis. States while visiting daughter used machine and it helped. Informed pt I will send msg but Dr. Jacobs might request office visit: States understands.  Last office visit: 3/18/2024  Upcoming Appointment: 9/19/2024  Thank you.  Signed:  Kelsey Canales LPN    ----- Message from Jameson Llamas sent at 6/10/2024  7:46 AM CDT -----  Type: Needs Medical Advice    Who Called:  Pt    Pharmacy name and phone #:    [facility] - [address] [address]  Phone: [phone] Fax: [phone]    Best Call Back Number: [phone]    Additional Information: Pt wants dr to order [facility] . Please call back to advise, Thanks!

## 2024-06-18 ENCOUNTER — OFFICE VISIT (OUTPATIENT)
Dept: FAMILY MEDICINE | Facility: CLINIC | Age: 74
End: 2024-06-18
Payer: MEDICARE

## 2024-06-18 VITALS
HEART RATE: 47 BPM | HEIGHT: 63 IN | BODY MASS INDEX: 23.48 KG/M2 | WEIGHT: 132.5 LBS | OXYGEN SATURATION: 93 % | DIASTOLIC BLOOD PRESSURE: 60 MMHG | SYSTOLIC BLOOD PRESSURE: 118 MMHG

## 2024-06-18 DIAGNOSIS — F32.4 MAJOR DEPRESSIVE DISORDER WITH SINGLE EPISODE, IN PARTIAL REMISSION: Primary | ICD-10-CM

## 2024-06-18 DIAGNOSIS — M06.09 RHEUMATOID ARTHRITIS OF MULTIPLE SITES WITH NEGATIVE RHEUMATOID FACTOR: ICD-10-CM

## 2024-06-18 DIAGNOSIS — I10 ESSENTIAL HYPERTENSION, BENIGN: ICD-10-CM

## 2024-06-18 NOTE — PROGRESS NOTES
"    [facility]  Primary Care Clin[facility] - [place], [place]    Family Medicine Office Visit    Chief Complaint   Patient presents with    Follow-up        HPI:  followup    Blood Pressure at goal on medication, with patient reporting prescription compliance  Is on steroids for RA management  Mood stable    Wants TENS unit to help with muscle pain    ROS: as above    Vitals:    06/18/24 1402   BP: 118/60   BP Location: Right arm   Patient Position: Sitting   BP Method: Large (Manual)   Pulse: (!) 47   SpO2: (!) 93%   Weight: 60.1 kg (132 lb 8 oz)   Height: 5' 3" (1.6 m)      Body mass index is 23.47 kg/m².      General:  AOx3, well nourished and developed in no acute distress  Eyes:  PERRLA, EOMI, vision intact grossly  ENT:  normal hearing, moist oral mucosa  Neck:  trachea midline with no masses or thyromegaly  Heart:  RRR, no murmurs.  No edema noted, extremities warm and well perfused  Lungs:  clear to auscultation bilaterally with symmetric chest movement  Abdomen:  Soft, nontender, nondistended.  Normal bowel sounds  Musculoskeletal:  Normal gait.  Normal posture.  Normal muscular development with no joint swelling.  Neurological:  CN II-XII grossly intact. Symmetric strength and sensation  Psych:  Normal mood and affect.  Able to demonstrate good judgement and personal insight.      Assessment/Plan:    1. Major depressive disorder with single episode, in partial remission  Overview:  Stable  Sec to spouse passing away in 23'      2. Essential hypertension, benign  Overview:  Elevated today  Sec to hip pains  Sec to anxiety after losing her spouse last May      3. Rheumatoid arthritis of multiple sites with negative rheumatoid factor  Overview:  With inc pain in L hip for 2 weeks  + RF , ZACHERY  Off mobic b/o glaucoma        Other orders  -     TENS unit and electrodes Cmpk; [facility] - Wireless TENS unit  Dispense: 1 each; Refill: 3         Stable on medication  At goal  Stable with rheumatology, start " TENS unit    Visit today included increased complexity associated with the care of the episodic problem htn, anxiety, RA addressed and managing the longitudinal care of the patient due to the serious and/or complex managed problem(s) htn, RA, anxiety.

## 2024-06-25 ENCOUNTER — TELEPHONE (OUTPATIENT)
Dept: FAMILY MEDICINE | Facility: CLINIC | Age: 74
End: 2024-06-25
Payer: MEDICARE

## 2024-06-25 NOTE — TELEPHONE ENCOUNTER
Melody Emery,  Please review this message below from this patient of Dr. Jacobs's in his absence this week.  Patient is requesting an order for TENS Unit to be sent to [facility] for bilateral hand pain due to arthritis.  Last office visit: 6/18/2024  Please review and advise.  Thank you.  Signed:  Kelsey Canales LPN    ----- Message from Tamra Brooks sent at 6/25/2024 10:13 AM CDT -----  Contact: PT  Type:  Needs Medical Advice    Who Called: PT   Symptoms (please be specific): [facility] WILL NOT FILL THE RX FOR A TENS UNIT  PLEASE ORDER THE TENS UNIT THROUGH A [facility] STORE   PLEASE CALL PT TO [email or web address] Referral Source    Would the patient rather a call back or a response via [facility]?  CALL   Best Call Back Number:  [phone] (home)   Additional Information: THANK YOU

## 2024-07-01 ENCOUNTER — TELEPHONE (OUTPATIENT)
Dept: FAMILY MEDICINE | Facility: CLINIC | Age: 74
End: 2024-07-01
Payer: MEDICARE

## 2024-07-27 ENCOUNTER — PATIENT MESSAGE (OUTPATIENT)
Dept: ADMINISTRATIVE | Facility: HOSPITAL | Age: 74
End: 2024-07-27
Payer: MEDICARE

## 2024-07-30 DIAGNOSIS — Z00.00 ENCOUNTER FOR MEDICARE ANNUAL WELLNESS EXAM: ICD-10-CM

## 2024-08-27 ENCOUNTER — LAB VISIT (OUTPATIENT)
Dept: LAB | Facility: CLINIC | Age: 74
End: 2024-08-27
Payer: MEDICARE

## 2024-08-27 ENCOUNTER — OFFICE VISIT (OUTPATIENT)
Dept: FAMILY MEDICINE | Facility: CLINIC | Age: 74
End: 2024-08-27
Payer: MEDICARE

## 2024-08-27 VITALS
HEIGHT: 63 IN | HEART RATE: 68 BPM | OXYGEN SATURATION: 98 % | SYSTOLIC BLOOD PRESSURE: 102 MMHG | BODY MASS INDEX: 23.16 KG/M2 | DIASTOLIC BLOOD PRESSURE: 80 MMHG | WEIGHT: 130.69 LBS

## 2024-08-27 DIAGNOSIS — K90.9 VITAMIN B12 DEFICIENCY DUE TO INTESTINAL MALABSORPTION: ICD-10-CM

## 2024-08-27 DIAGNOSIS — I10 ESSENTIAL HYPERTENSION, BENIGN: ICD-10-CM

## 2024-08-27 DIAGNOSIS — F32.4 MAJOR DEPRESSIVE DISORDER WITH SINGLE EPISODE, IN PARTIAL REMISSION: ICD-10-CM

## 2024-08-27 DIAGNOSIS — M06.09 RHEUMATOID ARTHRITIS OF MULTIPLE SITES WITH NEGATIVE RHEUMATOID FACTOR: Primary | ICD-10-CM

## 2024-08-27 DIAGNOSIS — E55.9 VITAMIN D DEFICIENCY: ICD-10-CM

## 2024-08-27 DIAGNOSIS — E53.8 VITAMIN B12 DEFICIENCY DUE TO INTESTINAL MALABSORPTION: ICD-10-CM

## 2024-08-27 DIAGNOSIS — Z53.20 MAMMOGRAM DECLINED: ICD-10-CM

## 2024-08-27 DIAGNOSIS — M06.09 RHEUMATOID ARTHRITIS OF MULTIPLE SITES WITH NEGATIVE RHEUMATOID FACTOR: ICD-10-CM

## 2024-08-27 LAB
25(OH)D3+25(OH)D2 SERPL-MCNC: 72 NG/ML (ref 30–96)
ALBUMIN SERPL BCP-MCNC: 3.7 G/DL (ref 3.5–5.2)
ALP SERPL-CCNC: 116 U/L (ref 55–135)
ALT SERPL W/O P-5'-P-CCNC: 10 U/L (ref 10–44)
ANION GAP SERPL CALC-SCNC: 10 MMOL/L (ref 8–16)
AST SERPL-CCNC: 16 U/L (ref 10–40)
BASOPHILS # BLD AUTO: 0.06 K/UL (ref 0–0.2)
BASOPHILS NFR BLD: 0.6 % (ref 0–1.9)
BILIRUB SERPL-MCNC: 0.7 MG/DL (ref 0.1–1)
BUN SERPL-MCNC: 9 MG/DL (ref 8–23)
CALCIUM SERPL-MCNC: 9.6 MG/DL (ref 8.7–10.5)
CHLORIDE SERPL-SCNC: 105 MMOL/L (ref 95–110)
CO2 SERPL-SCNC: 23 MMOL/L (ref 23–29)
CREAT SERPL-MCNC: 0.8 MG/DL (ref 0.5–1.4)
CRP SERPL-MCNC: 8.9 MG/L (ref 0–8.2)
DIFFERENTIAL METHOD BLD: ABNORMAL
EOSINOPHIL # BLD AUTO: 0.1 K/UL (ref 0–0.5)
EOSINOPHIL NFR BLD: 1 % (ref 0–8)
ERYTHROCYTE [DISTWIDTH] IN BLOOD BY AUTOMATED COUNT: 11.9 % (ref 11.5–14.5)
ERYTHROCYTE [SEDIMENTATION RATE] IN BLOOD BY WESTERGREN METHOD: 24 MM/HR (ref 0–20)
EST. GFR  (NO RACE VARIABLE): >60 ML/MIN/1.73 M^2
GLUCOSE SERPL-MCNC: 86 MG/DL (ref 70–110)
HCT VFR BLD AUTO: 41.2 % (ref 37–48.5)
HGB BLD-MCNC: 13.4 G/DL (ref 12–16)
IMM GRANULOCYTES # BLD AUTO: 0.03 K/UL (ref 0–0.04)
IMM GRANULOCYTES NFR BLD AUTO: 0.3 % (ref 0–0.5)
LYMPHOCYTES # BLD AUTO: 1.2 K/UL (ref 1–4.8)
LYMPHOCYTES NFR BLD: 12.6 % (ref 18–48)
MCH RBC QN AUTO: 30.4 PG (ref 27–31)
MCHC RBC AUTO-ENTMCNC: 32.5 G/DL (ref 32–36)
MCV RBC AUTO: 93 FL (ref 82–98)
MONOCYTES # BLD AUTO: 0.8 K/UL (ref 0.3–1)
MONOCYTES NFR BLD: 8 % (ref 4–15)
NEUTROPHILS # BLD AUTO: 7.3 K/UL (ref 1.8–7.7)
NEUTROPHILS NFR BLD: 77.5 % (ref 38–73)
NRBC BLD-RTO: 0 /100 WBC
PLATELET # BLD AUTO: 272 K/UL (ref 150–450)
PMV BLD AUTO: 9.4 FL (ref 9.2–12.9)
POTASSIUM SERPL-SCNC: 3.8 MMOL/L (ref 3.5–5.1)
PROT SERPL-MCNC: 7 G/DL (ref 6–8.4)
RBC # BLD AUTO: 4.41 M/UL (ref 4–5.4)
SODIUM SERPL-SCNC: 138 MMOL/L (ref 136–145)
TSH SERPL DL<=0.005 MIU/L-ACNC: 0.79 UIU/ML (ref 0.4–4)
VIT B12 SERPL-MCNC: >2000 PG/ML (ref 210–950)
WBC # BLD AUTO: 9.38 K/UL (ref 3.9–12.7)

## 2024-08-27 PROCEDURE — 82306 VITAMIN D 25 HYDROXY: CPT | Performed by: FAMILY MEDICINE

## 2024-08-27 PROCEDURE — 86140 C-REACTIVE PROTEIN: CPT | Performed by: FAMILY MEDICINE

## 2024-08-27 PROCEDURE — 80053 COMPREHEN METABOLIC PANEL: CPT | Performed by: FAMILY MEDICINE

## 2024-08-27 PROCEDURE — 84443 ASSAY THYROID STIM HORMONE: CPT | Performed by: FAMILY MEDICINE

## 2024-08-27 PROCEDURE — 82607 VITAMIN B-12: CPT | Performed by: FAMILY MEDICINE

## 2024-08-27 PROCEDURE — 85025 COMPLETE CBC W/AUTO DIFF WBC: CPT | Performed by: FAMILY MEDICINE

## 2024-08-27 PROCEDURE — 85651 RBC SED RATE NONAUTOMATED: CPT | Performed by: FAMILY MEDICINE

## 2024-08-27 RX ORDER — BRIMONIDINE TARTRATE AND TIMOLOL MALEATE 2; 5 MG/ML; MG/ML
1 SOLUTION OPHTHALMIC
COMMUNITY

## 2024-08-27 RX ORDER — HYDROXYCHLOROQUINE SULFATE 200 MG/1
200 TABLET, FILM COATED ORAL
COMMUNITY
Start: 2024-06-05 | End: 2024-08-27

## 2024-08-27 NOTE — PROGRESS NOTES
"    [facility]  [facility] - [place], [place]    Family Medicine Office Visit    Chief Complaint   Patient presents with    Follow-up     F/u on medication.         HPI: follow up:    Blood Pressure at goal on medication, with patient reporting prescription compliance  RA - is on plaquenil, and is having difficult time with this - notable GI effects and eye effects.  Wants labs today to assess metabolic function on this    Mood somewhat poor, but related to above     ROS: as above    Vitals:    08/27/24 0945   BP: 102/80   BP Location: Right arm   Patient Position: Sitting   BP Method: Large (Manual)   Pulse: 68   SpO2: 98%   Weight: 59.3 kg (130 lb 11.2 oz)   Height: 5' 3" (1.6 m)      Body mass index is 23.15 kg/m².      General:  AOx3, well nourished and developed in no acute distress  Eyes:  PERRLA, EOMI, vision intact grossly  ENT:  normal hearing, moist oral mucosa  Neck:  trachea midline with no masses or thyromegaly  Heart:  RRR, no murmurs.  No edema noted, extremities warm and well perfused  Lungs:  clear to auscultation bilaterally with symmetric chest movement  Abdomen:  Soft, nontender, nondistended.  Normal bowel sounds  Musculoskeletal:  Normal gait.  Normal posture.  Normal muscular development with no joint swelling.  Neurological:  CN II-XII grossly intact. Symmetric strength and sensation  Psych:  Normal mood and affect.  Able to demonstrate good judgement and personal insight.      Assessment/Plan:    1. Rheumatoid arthritis of multiple sites with negative rheumatoid factor  Overview:  With inc pain in L hip for 2 weeks  + RF , ZACHERY  Off mobic b/o glaucoma        2. Essential hypertension, benign  Overview:  Elevated today  Sec to hip pains  Sec to anxiety after losing her spouse last May      3. Major depressive disorder with single episode, in partial remission  Overview:  Stable  Sec to spouse passing away in 23'      4. Mammogram declined         Stop plaquenil, check labs.  " Send TENS to med supply  At goal  Stable  Stable    Visit today included increased complexity associated with the care of the episodic problem ra, htn addressed and managing the longitudinal care of the patient due to the serious and/or complex managed problem(s) ra, htn.

## 2024-08-28 ENCOUNTER — PATIENT MESSAGE (OUTPATIENT)
Dept: FAMILY MEDICINE | Facility: CLINIC | Age: 74
End: 2024-08-28
Payer: MEDICARE

## 2024-09-03 RX ORDER — PREDNISONE 5 MG/1
5 TABLET ORAL DAILY
Qty: 30 TABLET | Refills: 2 | Status: SHIPPED | OUTPATIENT
Start: 2024-09-03

## 2024-09-30 ENCOUNTER — PATIENT MESSAGE (OUTPATIENT)
Dept: ADMINISTRATIVE | Facility: HOSPITAL | Age: 74
End: 2024-09-30
Payer: MEDICARE

## 2024-10-01 DIAGNOSIS — Z12.11 SCREENING FOR COLON CANCER: ICD-10-CM

## 2024-10-03 ENCOUNTER — OFFICE VISIT (OUTPATIENT)
Dept: FAMILY MEDICINE | Facility: CLINIC | Age: 74
End: 2024-10-03
Payer: MEDICARE

## 2024-10-03 ENCOUNTER — LAB VISIT (OUTPATIENT)
Dept: LAB | Facility: CLINIC | Age: 74
End: 2024-10-03
Payer: MEDICARE

## 2024-10-03 ENCOUNTER — PATIENT MESSAGE (OUTPATIENT)
Dept: FAMILY MEDICINE | Facility: CLINIC | Age: 74
End: 2024-10-03

## 2024-10-03 VITALS
WEIGHT: 131.63 LBS | DIASTOLIC BLOOD PRESSURE: 80 MMHG | HEIGHT: 63 IN | BODY MASS INDEX: 23.32 KG/M2 | SYSTOLIC BLOOD PRESSURE: 130 MMHG | OXYGEN SATURATION: 98 % | HEART RATE: 75 BPM

## 2024-10-03 DIAGNOSIS — M06.09 RHEUMATOID ARTHRITIS OF MULTIPLE SITES WITH NEGATIVE RHEUMATOID FACTOR: ICD-10-CM

## 2024-10-03 DIAGNOSIS — F32.4 MAJOR DEPRESSIVE DISORDER WITH SINGLE EPISODE, IN PARTIAL REMISSION: Primary | ICD-10-CM

## 2024-10-03 DIAGNOSIS — R94.6 ABNORMAL RESULTS OF THYROID FUNCTION STUDIES: ICD-10-CM

## 2024-10-03 DIAGNOSIS — I10 ESSENTIAL HYPERTENSION, BENIGN: ICD-10-CM

## 2024-10-03 LAB
ALBUMIN SERPL BCP-MCNC: 3.8 G/DL (ref 3.5–5.2)
ALP SERPL-CCNC: 145 U/L (ref 55–135)
ALT SERPL W/O P-5'-P-CCNC: 15 U/L (ref 10–44)
ANION GAP SERPL CALC-SCNC: 12 MMOL/L (ref 8–16)
AST SERPL-CCNC: 16 U/L (ref 10–40)
BASOPHILS # BLD AUTO: 0.05 K/UL (ref 0–0.2)
BASOPHILS NFR BLD: 0.6 % (ref 0–1.9)
BILIRUB SERPL-MCNC: 0.5 MG/DL (ref 0.1–1)
BUN SERPL-MCNC: 9 MG/DL (ref 8–23)
CALCIUM SERPL-MCNC: 10 MG/DL (ref 8.7–10.5)
CHLORIDE SERPL-SCNC: 105 MMOL/L (ref 95–110)
CO2 SERPL-SCNC: 23 MMOL/L (ref 23–29)
CREAT SERPL-MCNC: 0.7 MG/DL (ref 0.5–1.4)
CRP SERPL-MCNC: 14.3 MG/L (ref 0–8.2)
DIFFERENTIAL METHOD BLD: ABNORMAL
EOSINOPHIL # BLD AUTO: 0.1 K/UL (ref 0–0.5)
EOSINOPHIL NFR BLD: 0.8 % (ref 0–8)
ERYTHROCYTE [DISTWIDTH] IN BLOOD BY AUTOMATED COUNT: 12.6 % (ref 11.5–14.5)
ERYTHROCYTE [SEDIMENTATION RATE] IN BLOOD BY WESTERGREN METHOD: 28 MM/HR (ref 0–20)
EST. GFR  (NO RACE VARIABLE): >60 ML/MIN/1.73 M^2
GLUCOSE SERPL-MCNC: 84 MG/DL (ref 70–110)
HCT VFR BLD AUTO: 40 % (ref 37–48.5)
HGB BLD-MCNC: 13.1 G/DL (ref 12–16)
IMM GRANULOCYTES # BLD AUTO: 0.01 K/UL (ref 0–0.04)
IMM GRANULOCYTES NFR BLD AUTO: 0.1 % (ref 0–0.5)
LYMPHOCYTES # BLD AUTO: 1.6 K/UL (ref 1–4.8)
LYMPHOCYTES NFR BLD: 18.7 % (ref 18–48)
MAGNESIUM SERPL-MCNC: 2.2 MG/DL (ref 1.6–2.6)
MCH RBC QN AUTO: 30.4 PG (ref 27–31)
MCHC RBC AUTO-ENTMCNC: 32.8 G/DL (ref 32–36)
MCV RBC AUTO: 93 FL (ref 82–98)
MONOCYTES # BLD AUTO: 0.8 K/UL (ref 0.3–1)
MONOCYTES NFR BLD: 8.8 % (ref 4–15)
NEUTROPHILS # BLD AUTO: 6.1 K/UL (ref 1.8–7.7)
NEUTROPHILS NFR BLD: 71 % (ref 38–73)
NRBC BLD-RTO: 0 /100 WBC
PLATELET # BLD AUTO: 306 K/UL (ref 150–450)
PMV BLD AUTO: 9.1 FL (ref 9.2–12.9)
POTASSIUM SERPL-SCNC: 3.7 MMOL/L (ref 3.5–5.1)
PROT SERPL-MCNC: 7.4 G/DL (ref 6–8.4)
RBC # BLD AUTO: 4.31 M/UL (ref 4–5.4)
SODIUM SERPL-SCNC: 140 MMOL/L (ref 136–145)
TSH SERPL DL<=0.005 MIU/L-ACNC: 0.62 UIU/ML (ref 0.4–4)
WBC # BLD AUTO: 8.63 K/UL (ref 3.9–12.7)

## 2024-10-03 PROCEDURE — 80053 COMPREHEN METABOLIC PANEL: CPT | Performed by: FAMILY MEDICINE

## 2024-10-03 PROCEDURE — 36415 COLL VENOUS BLD VENIPUNCTURE: CPT | Mod: ,,, | Performed by: FAMILY MEDICINE

## 2024-10-03 PROCEDURE — 84443 ASSAY THYROID STIM HORMONE: CPT | Performed by: FAMILY MEDICINE

## 2024-10-03 PROCEDURE — 85651 RBC SED RATE NONAUTOMATED: CPT | Performed by: FAMILY MEDICINE

## 2024-10-03 PROCEDURE — 83735 ASSAY OF MAGNESIUM: CPT | Performed by: FAMILY MEDICINE

## 2024-10-03 PROCEDURE — 86140 C-REACTIVE PROTEIN: CPT | Performed by: FAMILY MEDICINE

## 2024-10-03 PROCEDURE — 85025 COMPLETE CBC W/AUTO DIFF WBC: CPT | Performed by: FAMILY MEDICINE

## 2024-10-03 RX ORDER — HYDROXYCHLOROQUINE SULFATE 200 MG/1
200 TABLET, FILM COATED ORAL DAILY
COMMUNITY
End: 2024-10-03

## 2024-10-03 NOTE — PROGRESS NOTES
"    [facility]  [facility] Care [facility] - [place], [place]    Family Medicine Office Visit    Chief Complaint   Patient presents with    Follow-up        HPI:  followup:    Still reporting notable issues since being placed on plaquenil - still having eye issues and elevated blood pressure since stopping this about 1 month ago.  Labs obtained last visit showed elevated CRP and ESR.    BP slightly high today  Having anxiety with this, but mood otherwise stable.    B12 and Vit D stable    ROS: as above    Vitals:    10/03/24 0956 10/03/24 1033   BP: (!) 140/86 130/80   BP Location: Right arm    Patient Position: Sitting    Pulse: 75    SpO2: 98%    Weight: 59.7 kg (131 lb 9.6 oz)    Height: 5' 3" (1.6 m)       Body mass index is 23.31 kg/m².      General:  AOx3, well nourished and developed in no acute distress  Eyes:  PERRLA, EOMI, vision intact grossly  ENT:  normal hearing, moist oral mucosa  Neck:  trachea midline with no masses or thyromegaly  Heart:  RRR, no murmurs.  No edema noted, extremities warm and well perfused  Lungs:  clear to auscultation bilaterally with symmetric chest movement  Abdomen:  Soft, nontender, nondistended.  Normal bowel sounds  Musculoskeletal:  Normal gait.  Normal posture.  Normal muscular development with no joint swelling.  Neurological:  CN II-XII grossly intact. Symmetric strength and sensation  Psych:  Normal mood and affect.  Able to demonstrate good judgement and personal insight.      Assessment/Plan:    1. Major depressive disorder with single episode, in partial remission    2. Essential hypertension, benign    3. Rheumatoid arthritis of multiple sites with negative rheumatoid factor  -     CBC Auto Differential; Future; Expected date: 10/03/2024  -     Comprehensive Metabolic Panel; Future; Expected date: 10/03/2024  -     TSH; Future; Expected date: 10/03/2024  -     Sedimentation rate; Future; Expected date: 10/03/2024  -     C-Reactive Protein; Future; Expected date: " 10/03/2024  -     Magnesium; Future; Expected date: 10/03/2024    4. Abnormal results of thyroid function studies  -     TSH; Future; Expected date: 10/03/2024         Stable  At goal on recheck  Repeat bloodwork to check inflammatory markers off of plaquenil  Check thyroid function    Visit today included increased complexity associated with the care of the episodic problem ra, htn, anxiety addressed and managing the longitudinal care of the patient due to the serious and/or complex managed problem(s) ra, htn, anxiety.

## 2024-10-03 NOTE — PATIENT INSTRUCTIONS
Get labs today:    Supplements to consider to reduce inflammation:    Turmeric 1000mg twice a day  Magnesium oxide 400mg at bedtime    Folic acid 1000mg daily

## 2024-10-04 ENCOUNTER — PATIENT MESSAGE (OUTPATIENT)
Dept: FAMILY MEDICINE | Facility: CLINIC | Age: 74
End: 2024-10-04
Payer: MEDICARE

## 2024-10-07 RX ORDER — PREDNISONE 5 MG/1
10 TABLET ORAL DAILY
Qty: 60 TABLET | Refills: 11 | Status: SHIPPED | OUTPATIENT
Start: 2024-10-07 | End: 2025-10-07

## 2024-10-09 ENCOUNTER — PATIENT MESSAGE (OUTPATIENT)
Dept: ADMINISTRATIVE | Facility: HOSPITAL | Age: 74
End: 2024-10-09
Payer: MEDICARE

## 2024-10-14 ENCOUNTER — PATIENT MESSAGE (OUTPATIENT)
Dept: ADMINISTRATIVE | Facility: HOSPITAL | Age: 74
End: 2024-10-14
Payer: MEDICARE

## 2024-10-15 ENCOUNTER — PATIENT MESSAGE (OUTPATIENT)
Dept: FAMILY MEDICINE | Facility: CLINIC | Age: 74
End: 2024-10-15
Payer: MEDICARE

## 2024-10-15 NOTE — TELEPHONE ENCOUNTER
"[facility] is requesting information of vitamin infusions.  Patient had sent along another message in a different conversation for consideration as follows:    "Super is high glutathione, high vitamin C, B12, B-complex, magnesiun, biotin,mineral blend, tautin.  Super Plus is Max glutathione, high vitamin C, B-12, Mineral Blend, taurine, NAD+    Sorry I meant to put this with the first message."    Please advise.  "

## 2024-11-11 ENCOUNTER — PATIENT MESSAGE (OUTPATIENT)
Dept: FAMILY MEDICINE | Facility: CLINIC | Age: 74
End: 2024-11-11
Payer: MEDICARE

## 2024-11-22 DIAGNOSIS — Z78.0 MENOPAUSE: ICD-10-CM

## 2024-12-04 ENCOUNTER — OFFICE VISIT (OUTPATIENT)
Dept: FAMILY MEDICINE | Facility: CLINIC | Age: 74
End: 2024-12-04
Payer: MEDICARE

## 2024-12-04 ENCOUNTER — LAB VISIT (OUTPATIENT)
Dept: LAB | Facility: CLINIC | Age: 74
End: 2024-12-04
Payer: MEDICARE

## 2024-12-04 VITALS
DIASTOLIC BLOOD PRESSURE: 90 MMHG | HEART RATE: 75 BPM | OXYGEN SATURATION: 96 % | SYSTOLIC BLOOD PRESSURE: 138 MMHG | BODY MASS INDEX: 23.14 KG/M2 | HEIGHT: 63 IN | WEIGHT: 130.63 LBS

## 2024-12-04 DIAGNOSIS — M06.09 RHEUMATOID ARTHRITIS OF MULTIPLE SITES WITH NEGATIVE RHEUMATOID FACTOR: ICD-10-CM

## 2024-12-04 DIAGNOSIS — F32.4 MAJOR DEPRESSIVE DISORDER WITH SINGLE EPISODE, IN PARTIAL REMISSION: ICD-10-CM

## 2024-12-04 DIAGNOSIS — M06.09 RHEUMATOID ARTHRITIS OF MULTIPLE SITES WITH NEGATIVE RHEUMATOID FACTOR: Primary | ICD-10-CM

## 2024-12-04 DIAGNOSIS — I10 ESSENTIAL HYPERTENSION, BENIGN: ICD-10-CM

## 2024-12-04 LAB
CRP SERPL-MCNC: 4.5 MG/L (ref 0–8.2)
ERYTHROCYTE [SEDIMENTATION RATE] IN BLOOD BY WESTERGREN METHOD: 20 MM/HR (ref 0–20)

## 2024-12-04 PROCEDURE — 85651 RBC SED RATE NONAUTOMATED: CPT | Performed by: FAMILY MEDICINE

## 2024-12-04 PROCEDURE — G2211 COMPLEX E/M VISIT ADD ON: HCPCS | Mod: S$GLB,,, | Performed by: FAMILY MEDICINE

## 2024-12-04 PROCEDURE — 36415 COLL VENOUS BLD VENIPUNCTURE: CPT | Mod: ,,, | Performed by: FAMILY MEDICINE

## 2024-12-04 PROCEDURE — 99214 OFFICE O/P EST MOD 30 MIN: CPT | Mod: S$GLB,,, | Performed by: FAMILY MEDICINE

## 2024-12-04 PROCEDURE — 86140 C-REACTIVE PROTEIN: CPT | Performed by: FAMILY MEDICINE

## 2024-12-04 RX ORDER — LOSARTAN POTASSIUM 50 MG/1
50 TABLET ORAL DAILY
Qty: 90 TABLET | Refills: 3 | Status: SHIPPED | OUTPATIENT
Start: 2024-12-04 | End: 2025-12-04

## 2024-12-04 NOTE — PROGRESS NOTES
"    [facility]  [facility] - [place], [place]    Family Medicine Office Visit    Chief Complaint   Patient presents with    Follow-up     3 month f/u         HPI:  RA - feeling better today.  Is relying more on holistic measures to reduce inflammation, and feels quite good on this    BP elevated on initial check    Mood stable otherwise    ROS: as above    Vitals:    12/04/24 0928   BP: (!) 150/90   BP Location: Right arm   Patient Position: Sitting   Pulse: 75   SpO2: 96%   Weight: 59.2 kg (130 lb 9.6 oz)   Height: 5' 3" (1.6 m)      Body mass index is 23.13 kg/m².      General:  AOx3, well nourished and developed in no acute distress  Eyes:  PERRLA, EOMI, vision intact grossly  ENT:  normal hearing, moist oral mucosa  Neck:  trachea midline with no masses or thyromegaly  Heart:  RRR, no murmurs.  No edema noted, extremities warm and well perfused  Lungs:  clear to auscultation bilaterally with symmetric chest movement  Abdomen:  Soft, nontender, nondistended.  Normal bowel sounds  Musculoskeletal:  Normal gait.  Normal posture.  Normal muscular development with no joint swelling.  Neurological:  CN II-XII grossly intact. Symmetric strength and sensation  Psych:  Normal mood and affect.  Able to demonstrate good judgement and personal insight.      Assessment/Plan:    1. Rheumatoid arthritis of multiple sites with negative rheumatoid factor    2. Essential hypertension, benign    3. Major depressive disorder with single episode, in partial remission       Check inflammatory markers  Improved on recheck - change to 50mg in AM  stable    Visit today included increased complexity associated with the care of the episodic problem htn, RA addressed and managing the longitudinal care of the patient due to the serious and/or complex managed problem(s) HTN, RA.    "

## 2024-12-04 NOTE — PATIENT INSTRUCTIONS
Check inflammatory bloodwork today to see if infusions are helping  Good job reducing prednisone    Followup 4 months

## 2025-01-09 ENCOUNTER — PATIENT OUTREACH (OUTPATIENT)
Dept: ADMINISTRATIVE | Facility: HOSPITAL | Age: 75
End: 2025-01-09
Payer: MEDICARE

## 2025-01-09 ENCOUNTER — PATIENT MESSAGE (OUTPATIENT)
Dept: FAMILY MEDICINE | Facility: CLINIC | Age: 75
End: 2025-01-09
Payer: MEDICARE

## 2025-01-09 ENCOUNTER — PATIENT MESSAGE (OUTPATIENT)
Dept: ADMINISTRATIVE | Facility: HOSPITAL | Age: 75
End: 2025-01-09
Payer: MEDICARE

## 2025-01-10 NOTE — PROGRESS NOTES
[facility] Review & Patient Outreach Details      Contacted patient by phone:    Unable to reach/ Follow up date noted  Portal message sent

## 2025-01-30 DIAGNOSIS — Z00.00 ENCOUNTER FOR MEDICARE ANNUAL WELLNESS EXAM: ICD-10-CM

## 2025-02-25 ENCOUNTER — OFFICE VISIT (OUTPATIENT)
Dept: FAMILY MEDICINE | Facility: CLINIC | Age: 75
End: 2025-02-25
Payer: MEDICARE

## 2025-02-25 ENCOUNTER — LAB VISIT (OUTPATIENT)
Dept: LAB | Facility: CLINIC | Age: 75
End: 2025-02-25
Payer: MEDICARE

## 2025-02-25 VITALS
OXYGEN SATURATION: 100 % | HEIGHT: 63 IN | RESPIRATION RATE: 16 BRPM | SYSTOLIC BLOOD PRESSURE: 123 MMHG | DIASTOLIC BLOOD PRESSURE: 70 MMHG | HEART RATE: 67 BPM | BODY MASS INDEX: 23.04 KG/M2 | WEIGHT: 130 LBS

## 2025-02-25 DIAGNOSIS — Z67.91 UNSPECIFIED BLOOD TYPE, RH NEGATIVE: ICD-10-CM

## 2025-02-25 DIAGNOSIS — I10 ESSENTIAL HYPERTENSION, BENIGN: Primary | ICD-10-CM

## 2025-02-25 DIAGNOSIS — M06.09 RHEUMATOID ARTHRITIS OF MULTIPLE SITES WITH NEGATIVE RHEUMATOID FACTOR: ICD-10-CM

## 2025-02-25 DIAGNOSIS — F32.4 MAJOR DEPRESSIVE DISORDER WITH SINGLE EPISODE, IN PARTIAL REMISSION: ICD-10-CM

## 2025-02-25 PROBLEM — Z53.20 MAMMOGRAM DECLINED: Status: RESOLVED | Noted: 2024-03-18 | Resolved: 2025-02-25

## 2025-02-25 LAB
ABO + RH BLD: NORMAL
BLD GP AB SCN CELLS X3 SERPL QL: NORMAL
CRP SERPL-MCNC: 3.8 MG/L (ref 0–8.2)
ERYTHROCYTE [SEDIMENTATION RATE] IN BLOOD BY WESTERGREN METHOD: 18 MM/HR (ref 0–20)

## 2025-02-25 PROCEDURE — 86900 BLOOD TYPING SEROLOGIC ABO: CPT | Performed by: STUDENT IN AN ORGANIZED HEALTH CARE EDUCATION/TRAINING PROGRAM

## 2025-02-25 PROCEDURE — 86140 C-REACTIVE PROTEIN: CPT | Performed by: STUDENT IN AN ORGANIZED HEALTH CARE EDUCATION/TRAINING PROGRAM

## 2025-02-25 PROCEDURE — 85651 RBC SED RATE NONAUTOMATED: CPT | Performed by: STUDENT IN AN ORGANIZED HEALTH CARE EDUCATION/TRAINING PROGRAM

## 2025-02-25 RX ORDER — FOLIC ACID 1 MG/1
1 TABLET ORAL
COMMUNITY
Start: 2024-10-14 | End: 2025-10-14

## 2025-02-25 RX ORDER — BIMATOPROST 0.3 MG/ML
1 SOLUTION/ DROPS OPHTHALMIC
COMMUNITY

## 2025-02-25 NOTE — PROGRESS NOTES
"  [facility]  [address], [address]  [place] [address]    Subjective     Patient ID: Shell Lopez is a 74 y.o. female who comes to the clinic for a follow up visit.    Chief Complaint: management of chronic condition (Follow up)    Rheumatoid arthritis - takes OTC supplements and prednisone 10mg daily, previously saw a rheumatologist but didn't do well with the medications so she never went back    Covid spike proteins - wants her spike proteins checked to "see what those damned vaccines did to me"    HTN - takes losartan 50mg daily, runs in the 160s-170s in the late afternoon, normal here today    ROS negative unless stated above       Objective     Vitals:    02/25/25 1018   BP: 123/70   BP Location: Right arm   Patient Position: Sitting   Pulse: 67   Resp: 16   SpO2: 100%   Weight: 59 kg (130 lb)   Height: 5' 3" (1.6 m)        Wt Readings from Last 3 Encounters:   02/25/25 1018 59 kg (130 lb)   12/04/24 0928 59.2 kg (130 lb 9.6 oz)   10/03/24 0956 59.7 kg (131 lb 9.6 oz)        Physical Exam  Constitutional:       General: She is not in acute distress.     Appearance: Normal appearance. She is not ill-appearing.   HENT:      Head: Normocephalic and atraumatic.      Mouth/Throat:      Mouth: Mucous membranes are moist.   Eyes:      Extraocular Movements: Extraocular movements intact.      Pupils: Pupils are equal, round, and reactive to light.   Cardiovascular:      Rate and Rhythm: Normal rate.   Pulmonary:      Effort: Pulmonary effort is normal. No respiratory distress.   Musculoskeletal:         General: Normal range of motion.      Cervical back: Normal range of motion and neck supple.   Skin:     General: Skin is warm and dry.   Neurological:      General: No focal deficit present.      Mental Status: She is alert and oriented to person, place, and time. Mental status is at baseline.   Psychiatric:         Mood and Affect: Mood normal.    "      Behavior: Behavior normal.         Thought Content: Thought content normal.         Current Outpatient Medications   Medication Instructions    bimatoprost (LUMIGAN) 0.03 % ophthalmic drops 1 drop    brimonidine-timoloL (COMBIGAN) 0.2-0.5 % Drop 1 drop    folic acid (FOLVITE) 1 mg    losartan (COZAAR) 50 mg, Oral, Daily    predniSONE (DELTASONE) 10 mg, Oral, Daily    TENS unit and electrodes Cmpk Use daily as directed for relief of arthritis pain    Fax to [facility]           Assessment and Plan     1. Essential hypertension, benign    2. Rheumatoid arthritis of multiple sites with negative rheumatoid factor    3. Major depressive disorder with single episode, in partial remission        Here for follow up.    For rheumatoid arthritis, continue prednisone 10mg daily    For MDD, stable, not on any medicines    For HTN, will split losartan 50mg into 25mg BID as that's what she was doing    Unfortunately I am not able to order the COVID spike protein for her.  I told her this likely has little utility but she will bring it up with PCP at follow up    She also wants to know her blood type, type and screen ordered    RTC in 1 month w/ PCP      I encouraged the patient to take all medications as prescribed and to keep follow up appointments with their providers. ED precautions given for more concerning issues. Questions were invited and answered. Patient stated they had no other concerns. Follow up sooner if needed.     Elvin Car MD  02/25/2025 10:27 AM

## 2025-02-27 RX ORDER — PREDNISONE 5 MG/1
10 TABLET ORAL DAILY
Qty: 60 TABLET | Refills: 11 | Status: SHIPPED | OUTPATIENT
Start: 2025-02-27 | End: 2026-02-27

## 2025-02-27 NOTE — TELEPHONE ENCOUNTER
No care due was identified.  [facility] Embedded Care Due Messages. Reference number: [id number].   2/27/2025 10:08:27 AM CST

## 2025-02-27 NOTE — TELEPHONE ENCOUNTER
Last office visit: 2/25/2025  ----- Message from Katia sent at 2/27/2025  8:31 AM CST -----  Regarding: Refill  Type:  RX Refill RequestWho Called: ptRefill or New Rx: refillRX Name and Strength: predniSONE (DELTASONE) 5 MG tabletHow is the patient currently taking it? (ex. 1XDay): as perscribedIs this a 30 day or 90 day RX: 90Preferred Pharmacy with phone number: [facility] - [address] [address]Bfifr: [phone] Fax: [phone] or Mail Order: mailOrdering Provider: Lucianoould the patient rather a call back or a response via [facility]? [place] Call Back Number: [phone] Information:  Pt called to have her script [facility] of [facility]   Please call back to advise. Thanks!

## 2025-02-27 NOTE — TELEPHONE ENCOUNTER
Refill Routing Note   Medication(s) are not appropriate for processing by [facility] for the following reason(s):        Outside of protocol    [facility] action(s):  Route               Appointments  past 12m or future 3m with PCP    Date Provider   Last Visit   12/4/2024 Elvin Jacobs MD   Next Visit   4/4/2025 Elvin Jacobs MD   ED visits in past 90 days: 0        Note composed:10:25 AM 02/27/2025

## 2025-03-26 ENCOUNTER — PATIENT MESSAGE (OUTPATIENT)
Dept: FAMILY MEDICINE | Facility: CLINIC | Age: 75
End: 2025-03-26
Payer: MEDICARE

## 2025-04-04 ENCOUNTER — OFFICE VISIT (OUTPATIENT)
Dept: FAMILY MEDICINE | Facility: CLINIC | Age: 75
End: 2025-04-04
Payer: MEDICARE

## 2025-04-04 VITALS
OXYGEN SATURATION: 98 % | SYSTOLIC BLOOD PRESSURE: 130 MMHG | DIASTOLIC BLOOD PRESSURE: 78 MMHG | BODY MASS INDEX: 23.8 KG/M2 | HEIGHT: 63 IN | WEIGHT: 134.31 LBS | HEART RATE: 78 BPM

## 2025-04-04 DIAGNOSIS — M06.09 RHEUMATOID ARTHRITIS OF MULTIPLE SITES WITH NEGATIVE RHEUMATOID FACTOR: ICD-10-CM

## 2025-04-04 DIAGNOSIS — F32.4 MAJOR DEPRESSIVE DISORDER WITH SINGLE EPISODE, IN PARTIAL REMISSION: ICD-10-CM

## 2025-04-04 DIAGNOSIS — I10 ESSENTIAL HYPERTENSION, BENIGN: Primary | ICD-10-CM

## 2025-04-04 RX ORDER — DOXYCYCLINE HYCLATE 100 MG
100 TABLET ORAL 2 TIMES DAILY
Qty: 20 TABLET | Refills: 0 | Status: SHIPPED | OUTPATIENT
Start: 2025-04-04 | End: 2025-04-14

## 2025-04-04 NOTE — PROGRESS NOTES
"    [facility]  [facility] - [place], [place]    Family Medicine Office Visit    Chief Complaint   Patient presents with    Follow-up     4 month follow up         HPI:  RA stable - inflammatory markers are quite low  Blood Pressure at goal on medication, with patient reporting prescription compliance  Mood stable on medication    ROS: as above    Vitals:    04/04/25 1101   BP: 130/78   BP Location: Left arm   Patient Position: Sitting   Pulse: 78   SpO2: 98%   Weight: 60.9 kg (134 lb 4.8 oz)   Height: 5' 3" (1.6 m)      Body mass index is 23.79 kg/m².      General:  AOx3, well nourished and developed in no acute distress  Eyes:  PERRLA, EOMI, vision intact grossly  ENT:  normal hearing, moist oral mucosa  Neck:  trachea midline with no masses or thyromegaly  Heart:  RRR, no murmurs.  No edema noted, extremities warm and well perfused  Lungs:  clear to auscultation bilaterally with symmetric chest movement  Abdomen:  Soft, nontender, nondistended.  Normal bowel sounds  Musculoskeletal:  Normal gait.  Normal posture.  Normal muscular development with no joint swelling.  Neurological:  CN II-XII grossly intact. Symmetric strength and sensation  Psych:  Normal mood and affect.  Able to demonstrate good judgement and personal insight.      Assessment/Plan:    1. Essential hypertension, benign    2. Rheumatoid arthritis of multiple sites with negative rheumatoid factor    3. Major depressive disorder with single episode, in partial remission         At goal  Stable, has made marked efforts and inflammatory markers are lower - has concerns that there may have been bacterial involvement playing a role.  I don't see any harm in short course of doxy  Stable currently    Visit today included increased complexity associated with the care of the episodic problem RA, htn, depression addressed and managing the longitudinal care of the patient due to the serious and/or complex managed problem(s) RA, htn, " depression.

## 2025-04-17 ENCOUNTER — OFFICE VISIT (OUTPATIENT)
Dept: FAMILY MEDICINE | Facility: CLINIC | Age: 75
End: 2025-04-17
Payer: MEDICARE

## 2025-04-17 ENCOUNTER — PATIENT MESSAGE (OUTPATIENT)
Dept: FAMILY MEDICINE | Facility: CLINIC | Age: 75
End: 2025-04-17

## 2025-04-17 ENCOUNTER — LAB VISIT (OUTPATIENT)
Dept: LAB | Facility: CLINIC | Age: 75
End: 2025-04-17
Payer: MEDICARE

## 2025-04-17 VITALS
HEART RATE: 66 BPM | HEIGHT: 63 IN | SYSTOLIC BLOOD PRESSURE: 168 MMHG | WEIGHT: 132.5 LBS | BODY MASS INDEX: 23.48 KG/M2 | DIASTOLIC BLOOD PRESSURE: 90 MMHG | OXYGEN SATURATION: 98 %

## 2025-04-17 DIAGNOSIS — R73.9 BLOOD GLUCOSE ELEVATED: ICD-10-CM

## 2025-04-17 DIAGNOSIS — Z01.818 PREOP EXAM FOR INTERNAL MEDICINE: ICD-10-CM

## 2025-04-17 DIAGNOSIS — Z11.59 ENCOUNTER FOR HEPATITIS C SCREENING TEST FOR LOW RISK PATIENT: Primary | ICD-10-CM

## 2025-04-17 DIAGNOSIS — I10 ESSENTIAL HYPERTENSION, BENIGN: ICD-10-CM

## 2025-04-17 DIAGNOSIS — Z11.59 ENCOUNTER FOR HEPATITIS C SCREENING TEST FOR LOW RISK PATIENT: ICD-10-CM

## 2025-04-17 DIAGNOSIS — I49.3 PVC (PREMATURE VENTRICULAR CONTRACTION): ICD-10-CM

## 2025-04-17 LAB
ABSOLUTE EOSINOPHIL (OHS): 0.09 K/UL
ABSOLUTE MONOCYTE (OHS): 0.71 K/UL (ref 0.3–1)
ABSOLUTE NEUTROPHIL COUNT (OHS): 4.29 K/UL (ref 1.8–7.7)
ALBUMIN SERPL BCP-MCNC: 3.7 G/DL (ref 3.5–5.2)
ALP SERPL-CCNC: 92 UNIT/L (ref 40–150)
ALT SERPL W/O P-5'-P-CCNC: 14 UNIT/L (ref 10–44)
ANION GAP (OHS): 9 MMOL/L (ref 8–16)
AST SERPL-CCNC: 15 UNIT/L (ref 11–45)
BASOPHILS # BLD AUTO: 0.05 K/UL
BASOPHILS NFR BLD AUTO: 0.7 %
BILIRUB SERPL-MCNC: 0.6 MG/DL (ref 0.1–1)
BUN SERPL-MCNC: 10 MG/DL (ref 8–23)
CALCIUM SERPL-MCNC: 9.6 MG/DL (ref 8.7–10.5)
CHLORIDE SERPL-SCNC: 107 MMOL/L (ref 95–110)
CO2 SERPL-SCNC: 25 MMOL/L (ref 23–29)
CREAT SERPL-MCNC: 0.7 MG/DL (ref 0.5–1.4)
EAG (OHS): 117 MG/DL (ref 68–131)
ERYTHROCYTE [DISTWIDTH] IN BLOOD BY AUTOMATED COUNT: 12.5 % (ref 11.5–14.5)
GFR SERPLBLD CREATININE-BSD FMLA CKD-EPI: >60 ML/MIN/1.73/M2
GLUCOSE SERPL-MCNC: 88 MG/DL (ref 70–110)
HBA1C MFR BLD: 5.7 % (ref 4–5.6)
HCT VFR BLD AUTO: 41.6 % (ref 37–48.5)
HGB BLD-MCNC: 13.5 GM/DL (ref 12–16)
IMM GRANULOCYTES # BLD AUTO: 0.01 K/UL (ref 0–0.04)
IMM GRANULOCYTES NFR BLD AUTO: 0.1 % (ref 0–0.5)
LYMPHOCYTES # BLD AUTO: 1.78 K/UL (ref 1–4.8)
MCH RBC QN AUTO: 30.3 PG (ref 27–31)
MCHC RBC AUTO-ENTMCNC: 32.5 G/DL (ref 32–36)
MCV RBC AUTO: 93 FL (ref 82–98)
NUCLEATED RBC (/100WBC) (OHS): 0 /100 WBC
PLATELET # BLD AUTO: 255 K/UL (ref 150–450)
PMV BLD AUTO: 9.3 FL (ref 9.2–12.9)
POTASSIUM SERPL-SCNC: 3.6 MMOL/L (ref 3.5–5.1)
PROT SERPL-MCNC: 7.1 GM/DL (ref 6–8.4)
RBC # BLD AUTO: 4.46 M/UL (ref 4–5.4)
RELATIVE EOSINOPHIL (OHS): 1.3 %
RELATIVE LYMPHOCYTE (OHS): 25.7 % (ref 18–48)
RELATIVE MONOCYTE (OHS): 10.2 % (ref 4–15)
RELATIVE NEUTROPHIL (OHS): 62 % (ref 38–73)
SODIUM SERPL-SCNC: 141 MMOL/L (ref 136–145)
WBC # BLD AUTO: 6.93 K/UL (ref 3.9–12.7)

## 2025-04-17 PROCEDURE — 82043 UR ALBUMIN QUANTITATIVE: CPT | Performed by: INTERNAL MEDICINE

## 2025-04-17 PROCEDURE — 80053 COMPREHEN METABOLIC PANEL: CPT

## 2025-04-17 PROCEDURE — 85025 COMPLETE CBC W/AUTO DIFF WBC: CPT

## 2025-04-17 PROCEDURE — 83036 HEMOGLOBIN GLYCOSYLATED A1C: CPT

## 2025-04-17 PROCEDURE — 36415 COLL VENOUS BLD VENIPUNCTURE: CPT | Mod: ,,, | Performed by: INTERNAL MEDICINE

## 2025-04-17 PROCEDURE — 86803 HEPATITIS C AB TEST: CPT | Performed by: INTERNAL MEDICINE

## 2025-04-17 NOTE — ASSESSMENT & PLAN NOTE
Patient related that to stress of eye surgery  She declined inc her losartan  Ref to Card  Dec caffeine and diet modif

## 2025-04-17 NOTE — PROGRESS NOTES
Subjective:       Patient ID: Shell Lopez is a 74 y.o. female.    Chief Complaint: Follow-up and Pre-op Exam      History of Present Illness    CHIEF COMPLAINT:  Shell presents for a pre-operative clearance for left eye surgery scheduled for the following day.    HPI:  Shell is scheduled for left eye surgery tomorrow. She has a history of high blood pressure, which she states is not typically elevated. She has glaucoma affecting the left eye, which is the target of the upcoming surgery. She also has rheumatoid arthritis, for which she is taking prednisone as prescribed by Dr. Andrade, and is using eye drops. She expresses dissatisfaction with the pre-operative clearance process, indicating it is a new requirement for her eye surgeries.    She denies having any heart conditions, lung conditions, or previous heart stents.    MEDICATIONS:  Shell is on Lisinopril 50 mg daily for high blood pressure. She is also taking Prednisone for rheumatoid arthritis and eye drops for glaucoma.    MEDICAL HISTORY:  Shell has a history of hypertension, glaucoma, and rheumatoid arthritis.    SURGICAL HISTORY:  She is scheduled for left eye surgery the following day, which is Good Friday.    ALLERGIES:  Shell is allergic to sulfur. She also states that she does not take codeine.    SOCIAL HISTORY:  Alcohol: Red wine occasionally  Smoking: Denies  Denies illegal drug use         Review of Systems   Constitutional:  Negative for activity change, fever and unexpected weight change.   Respiratory: Negative.     Cardiovascular: Negative.    Neurological: Negative.          Objective:      Physical Exam  Vitals and nursing note reviewed. Exam conducted with a chaperone present.   Constitutional:       Appearance: Normal appearance.   HENT:      Head: Normocephalic and atraumatic.   Eyes:      Extraocular Movements: Extraocular movements intact.      Pupils: Pupils are equal, round, and reactive to light.   Cardiovascular:      Rate  and Rhythm: Normal rate. Rhythm irregular.      Pulses: Normal pulses.      Heart sounds: Normal heart sounds.   Pulmonary:      Effort: Pulmonary effort is normal.      Breath sounds: Normal breath sounds.   Abdominal:      General: Abdomen is flat. Bowel sounds are normal.      Palpations: Abdomen is soft.   Musculoskeletal:      Cervical back: Normal range of motion and neck supple.   Neurological:      Mental Status: She is alert.         Assessment:       1. Encounter for hepatitis C screening test for low risk patient  -     Hepatitis C Antibody; Future; Expected date: 04/17/2025    2. Essential hypertension, benign  Overview:  Elevated    Assessment & Plan:  Patient related that to stress of eye surgery  She declined inc her losartan  Ref to Card  Dec caffeine and diet modif    Orders:  -     CBC Auto Differential; Future; Expected date: 04/17/2025  -     Comprehensive Metabolic Panel; Future; Expected date: 04/17/2025  -     Hepatitis C Antibody; Future; Expected date: 04/17/2025  -     Microalbumin/Creatinine Ratio, Urine; Future; Expected date: 04/17/2025  -     Hemoglobin A1C; Future; Expected date: 04/17/2025    3. Preop exam for internal medicine  Overview:  No new c/o except stress    Assessment & Plan:  Get 12 lead EKG  Get basic labs    Orders:  -     CBC Auto Differential; Future; Expected date: 04/17/2025  -     Comprehensive Metabolic Panel; Future; Expected date: 04/17/2025  -     Hepatitis C Antibody; Future; Expected date: 04/17/2025  -     Microalbumin/Creatinine Ratio, Urine; Future; Expected date: 04/17/2025  -     Hemoglobin A1C; Future; Expected date: 04/17/2025    4. Blood glucose elevated  -     Hemoglobin A1C; Future; Expected date: 04/17/2025    5. PVC (premature ventricular contraction)  Overview:  No current Sx    Assessment & Plan:  Ref to Card  Dec caffeine    Orders:  -     Ambulatory referral/consult to Cardiology; Future; Expected date: 04/24/2025           Plan:      "  Assessment & Plan    H40.1220 Low-tension glaucoma, left eye, stage unspecified  I10 Essential (primary) hypertension  M06.9 Rheumatoid arthritis, unspecified  Z88.2 Allergy status to sulfonamides  Z88.8 Allergy status to other drugs, medicaments and biological substances  Z72.89 Other problems related to lifestyle    IMPRESSION:  - Assessed for pre-operative clearance for left eye surgery scheduled for tomorrow.  - Noted elevated BP, potentially impacting surgical clearance.  - Considered history of HTN and current Lisinopril therapy.  - Evaluated comorbidities including rheumatoid arthritis and glaucoma.  - Reviewed allergies to sulfur and avoidance of codeine.  - Determined need for labs and EKG as part of pre-operative evaluation.    GLAUCOMA (LEFT EYE):  - Noted that the patient has glaucoma in the left eye and is scheduled for eye surgery.  - Confirmed that the patient is using ophthalmic drops prescribed by the ophthalmologist.    HYPERTENSION:  - Noted that the patient's blood pressure is elevated, which may impact the scheduled surgery.  - Planned to reassess blood pressure.  - Confirmed the patient's history of hypertension.  - Verified that the patient is taking Lisinopril 50 mg daily for blood pressure management.    RHEUMATOID ARTHRITIS:  - Acknowledged the patient's diagnosis of rheumatoid arthritis.  - Confirmed that the patient is taking prednisone as prescribed by Dr. Andrade for rheumatoid arthritis management.    ALLERGIES AND MEDICATIONS:  - Noted the patient's reported allergy to sulfa drugs.  - Documented that the patient does not take codeine or any medications containing codeine.    LIFESTYLE AND GENERAL HEALTH:  - Noted that the patient occasionally consumes a glass of red wine.  - Recorded the patient's weight as 132.5 lbs and height as 5'3".  - Inquired about the patient's smoking habits, alcohol consumption, and use of illicit substances.    PRE-OPERATIVE PREPARATION:  - Explained the " necessity of pre-operative clearance and its components to the patient.  - Ordered pre-operative labs including CBC, glucose, and potassium.  - Ordered an EKG.  - Reviewed importance of lab results and how to access them through patient portal.       Shell was seen today for follow-up and pre-op exam.    Diagnoses and all orders for this visit:    Encounter for hepatitis C screening test for low risk patient  -     Hepatitis C Antibody; Future    Essential hypertension, benign  -     CBC Auto Differential; Future  -     Comprehensive Metabolic Panel; Future  -     Hepatitis C Antibody; Future  -     Microalbumin/Creatinine Ratio, Urine; Future  -     Hemoglobin A1C; Future    Preop exam for internal medicine  -     CBC Auto Differential; Future  -     Comprehensive Metabolic Panel; Future  -     Hepatitis C Antibody; Future  -     Microalbumin/Creatinine Ratio, Urine; Future  -     Hemoglobin A1C; Future    Blood glucose elevated  -     Hemoglobin A1C; Future    PVC (premature ventricular contraction)  -     Ambulatory referral/consult to Cardiology; Future          Visit today included increased complexity associated with the care of the episodic problem.   I addressed and managing the longitudinal care of the patient due to the serious and/or complex managed problem(s).  .

## 2025-04-17 NOTE — ASSESSMENT & PLAN NOTE
Caller: Rico Armstrong    Relationship: Self    Best call back number: [phone]    What orders are you requesting (i.e. lab or imaging): BLOOD LAB ORDERS FOR UPCOMING APPT    In what timeframe would the patient need to come in: 02/03/2025    Where will you receive your lab/imaging services: IN OFFICE LAB[facility]    Additional notes: PATIENT HAS AN APPT ON 02/05/2025. HE WOULD LIKE TO HAVE HIS LABS DRAWN MONDAY 02/03/2025 AT 8 AM. PLEASE PUT ORDERS IN SYSTEM AND CALL PATIENT TO CONFIRM.         Get 12 lead EKG  Get basic labs

## 2025-04-18 LAB
ALBUMIN/CREAT UR: 4.2 UG/MG
CREAT UR-MCNC: 144 MG/DL (ref 15–325)
HCV AB SERPL QL IA: NORMAL
MICROALBUMIN UR-MCNC: 6 UG/ML (ref ?–5000)

## 2025-04-21 ENCOUNTER — RESULTS FOLLOW-UP (OUTPATIENT)
Dept: FAMILY MEDICINE | Facility: CLINIC | Age: 75
End: 2025-04-21

## 2025-04-22 ENCOUNTER — OFFICE VISIT (OUTPATIENT)
Dept: CARDIOLOGY | Facility: CLINIC | Age: 75
End: 2025-04-22
Payer: MEDICARE

## 2025-04-22 VITALS
HEIGHT: 63 IN | DIASTOLIC BLOOD PRESSURE: 83 MMHG | OXYGEN SATURATION: 98 % | WEIGHT: 130.69 LBS | HEART RATE: 76 BPM | BODY MASS INDEX: 23.16 KG/M2 | SYSTOLIC BLOOD PRESSURE: 142 MMHG

## 2025-04-22 DIAGNOSIS — Z01.810 PREOP CARDIOVASCULAR EXAM: ICD-10-CM

## 2025-04-22 DIAGNOSIS — I10 WHITE COAT SYNDROME WITH DIAGNOSIS OF HYPERTENSION: ICD-10-CM

## 2025-04-22 DIAGNOSIS — I10 PRIMARY HYPERTENSION: Primary | ICD-10-CM

## 2025-04-22 DIAGNOSIS — R73.03 PREDIABETES: ICD-10-CM

## 2025-04-22 DIAGNOSIS — E78.5 DYSLIPIDEMIA: ICD-10-CM

## 2025-04-22 DIAGNOSIS — Z91.89 CARDIOVASCULAR EVENT RISK: ICD-10-CM

## 2025-04-22 DIAGNOSIS — H40.89 OTHER SPECIFIED GLAUCOMA, UNSPECIFIED LATERALITY: ICD-10-CM

## 2025-04-22 DIAGNOSIS — I49.3 PVC (PREMATURE VENTRICULAR CONTRACTION): ICD-10-CM

## 2025-04-22 DIAGNOSIS — M06.09 RHEUMATOID ARTHRITIS OF MULTIPLE SITES WITH NEGATIVE RHEUMATOID FACTOR: ICD-10-CM

## 2025-04-22 DIAGNOSIS — I49.3 FREQUENT PVCS: ICD-10-CM

## 2025-04-22 DIAGNOSIS — F43.9 STRESS: ICD-10-CM

## 2025-04-22 LAB
OHS QRS DURATION: 68 MS
OHS QTC CALCULATION: 443 MS

## 2025-04-22 PROCEDURE — 99205 OFFICE O/P NEW HI 60 MIN: CPT | Mod: S$PBB,,, | Performed by: INTERNAL MEDICINE

## 2025-04-22 PROCEDURE — 99214 OFFICE O/P EST MOD 30 MIN: CPT | Mod: PBBFAC,PN | Performed by: INTERNAL MEDICINE

## 2025-04-22 PROCEDURE — 99999 PR PBB SHADOW E&M-EST. PATIENT-LVL IV: CPT | Mod: PBBFAC,,, | Performed by: INTERNAL MEDICINE

## 2025-04-22 RX ORDER — ROSUVASTATIN CALCIUM 5 MG/1
5 TABLET, COATED ORAL DAILY
Qty: 90 TABLET | Refills: 3 | Status: SHIPPED | OUTPATIENT
Start: 2025-04-22 | End: 2026-04-22

## 2025-04-22 NOTE — PATIENT INSTRUCTIONS
Recommended Mediterranean dietEating Heart-Healthy Food: Using the DASH Plan  Eating for your heart doesnt have to be hard or boring. You just need to know how to make healthier choices. The DASH eating plan has been developed to help you do just that. DASH stands for Dietary Approaches to Stop Hypertension. It is a plan that has been proven to be healthier for your heart and to lower your risk for high blood pressure. It can also help lower your risk for cancer, heart disease, osteoporosis, and diabetes.  Choosing from Each Food Group  Choose foods from each of the food groups below each day. Try to get the recommended number of servings for each food group. The serving numbers are based on a diet of 2,000 calories a day. Talk to your doctor if youre unsure about your calorie needs.  Grains   Servings: 7-8 a day  A serving is:  1 slice bread  1 ounce dry cereal  half a cup cooked rice or pasta  Best choices: Whole grains and any grains high in fiber.  Vegetables   Servings: 4-5 a day  A serving is:  1 cup raw leafy vegetable  Half a cup cooked vegetable  Three-quarter cup vegetable juice  Best choices: Fresh or frozen vegetable prepared without too much added salt or fat.    Fruits   Servings: 4-5 a day  A serving is:  Three-quarter cup fruit juice  1 medium fruit  One-quarter cup dried fruit  One-half cup fresh, frozen, or canned fruit  Best choices: A variety of fresh fruits of different colors. Whole fruits are a much better choice than fruit juices.  Low-fat or Fat Free Dairy   Servings: 2-3 a day  A serving is:  8 ounces milk  1 cup yogurt  One and a half ounces cheese  Best choices: Skim or 1% milk, low-fat or fat free yogurt or buttermilk, and low-fat cheeses.       Meat, Poultry, Fish   Servings: 2 or fewer a day  A serving is:  3 ounces cooked meat, poultry, or fish  Best choices: Lean meats and fish. Trim away visible fat. Broil, roast, or boil instead of frying. Remove skin from poultry before eating.   Nuts, Seeds, Beans   Servings: 4-5 a week  A serving is:  One third cup nuts (or one and a half ounces)  2 tablespoons sunflower seeds  Half a cup cooked beans  Best choices: Dry roasted nuts with no salt added, lentils, kidney beans, garbanzo beans, and whole pruitt beans.    Fats and Oils   Servings: 2 a day  A serving is:  1 teaspoon vegetable oil  1 teaspoon soft margarine  1 tablespoon low-fat mayonnaise  1 teaspoon regular mayonnaise  2 tablespoons light salad dressing  1 tablespoon regular salad dressing  Best choices: Monounsaturated and polyunsaturated fats such as olive, canola, or safflower oil.  Sweets   Servings: 5 a week or fewer  A serving is:  1 tablespoon sugar, maple syrup, or honey  1 tablespoon jam or jelly  1 half-ounce jelly beans (about 15)  8 ounces lemonade  Best choices: Dried fruit can be a satisfying sweet. Choose low-fat sweets when possible. And watch your serving sizes!       Aerobic Exercise for a Healthy Heart  Exercise is a lot more than an energy booster and a stress reliever. It also strengthens your heart muscle, lowers your blood pressure and blood cholesterol, and burns calories.      Remember, some activity is better than none.     Choose an Aerobic Activity  Choose a nonstop activity that makes your heart and lungs work harder than they do when you rest or walk normally. This aerobic exercise can improve the way your heart and other muscles use oxygen. Make it fun by exercising with a friend and choosing an activity you enjoy. Here are some ideas:  Walking  Swimming  Bicycling  Stair climbing  Dancing  Jogging  Exercise Regularly  If you havent been exercising regularly,  get your doctors okay first. Then start slowly.  Here are some tips:  Begin exercising 3 times a week for 5-10 minutes at a time.  When you feel comfortable, add a few minutes each week.  Slowly build up to exercising 3-4 times each week for 20-40 minutes. Aim for a total of 150 or more minutes a  week.  Be sure to carry your nitroglycerin with you when you exercise.  If you get angina when youre exercising, stop what youre doing, take your nitroglycerin, and call your doctor.  © 8900-8414 [employer], [address] [place] [address], [place] [address]. All rights reserved. This information is not intended as a substitute for professional medical care. Always follow your healthcare professional's instructions.

## 2025-04-22 NOTE — PROGRESS NOTES
Subjective:        Patient ID:  Shell Lopez is a 74 y.o. female who presents for evaluation of Hypertension and Establish Care  Referred by Cosme Keith MD   PCP: Elvin Jacobs MD  No prior cardiologist  Eye surgeon: Dr. Samayoa in [place], planning on operation for shunt in the OS for 5/2/2025  Lives alone, three pets.  Brother, Meño, here in the [facility], helpful  Children out[place]of[place]state  Retired     Patient is a new patient to me.     SDOH: vision loss due to glaucoma, limited driving  Health literacy: high  Vaccinations: decline vaccination, had the first 2 COVID. No COVID infection  Activities:  always on her feet, bike and walk at least 2 days weekly for an 20 minutes to an hour, no problem, not limited.  Nicotine: former smoker, quit 1995, smoked 20 years, < 1 ppd  Alcohol: yes, 1 glass of red wine once a month. Max 1 drink in any 24 hours.  Illicit drugs: No,  Cardiac concerns:  none  Home BP: Yes 120 to 130 /65 to 75  Medication compliance: Yes, do not skip  Diet:  Mediterranean   Caffeine: How much do you consume a day? 3 cpd , no sleep problem  Labs:   Sodium   Date Value Ref Range Status   04/17/2025 141 136 - 145 mmol/L Final   10/03/2024 140 136 - 145 mmol/L Final     Potassium   Date Value Ref Range Status   04/17/2025 3.6 3.5 - 5.1 mmol/L Final   10/03/2024 3.7 3.5 - 5.1 mmol/L Final     Chloride   Date Value Ref Range Status   04/17/2025 107 95 - 110 mmol/L Final   10/03/2024 105 95 - 110 mmol/L Final     CO2   Date Value Ref Range Status   04/17/2025 25 23 - 29 mmol/L Final   10/03/2024 23 23 - 29 mmol/L Final     Glucose   Date Value Ref Range Status   10/03/2024 84 70 - 110 mg/dL Final     BUN   Date Value Ref Range Status   04/17/2025 10 8 - 23 mg/dL Final     Creatinine   Date Value Ref Range Status   04/17/2025 0.7 0.5 - 1.4 mg/dL Final     Calcium   Date Value Ref Range Status   04/17/2025 9.6 8.7 - 10.5 mg/dL Final   10/03/2024 10.0 8.7 - 10.5 mg/dL  Final     Total Protein   Date Value Ref Range Status   10/03/2024 7.4 6.0 - 8.4 g/dL Final     Albumin   Date Value Ref Range Status   04/17/2025 3.7 3.5 - 5.2 g/dL Final   10/03/2024 3.8 3.5 - 5.2 g/dL Final     Total Bilirubin   Date Value Ref Range Status   10/03/2024 0.5 0.1 - 1.0 mg/dL Final     Comment:     For infants and newborns, interpretation of results should be based  on gestational age, weight and in agreement with clinical  observations.    Premature Infant recommended reference ranges:  Up to 24 hours.............<8.0 mg/dL  Up to 48 hours............<12.0 mg/dL  3-5 days..................<15.0 mg/dL  6-29 days.................<15.0 mg/dL       Bilirubin Total   Date Value Ref Range Status   04/17/2025 0.6 0.1 - 1.0 mg/dL Final     Comment:     For infants and newborns, interpretation of results should be based   on gestational age, weight and in agreement with clinical   observations.    Premature Infant recommended reference ranges:   0-24 hours:  <8.0 mg/dL   24-48 hours: <12.0 mg/dL   3-5 days:    <15.0 mg/dL   6-29 days:   <15.0 mg/dL     Alkaline Phosphatase   Date Value Ref Range Status   10/03/2024 145 (H) 55 - 135 U/L Final     ALP   Date Value Ref Range Status   04/17/2025 92 40 - 150 unit/L Final     AST   Date Value Ref Range Status   04/17/2025 15 11 - 45 unit/L Final   10/03/2024 16 10 - 40 U/L Final     ALT   Date Value Ref Range Status   04/17/2025 14 10 - 44 unit/L Final   10/03/2024 15 10 - 44 U/L Final     Anion Gap   Date Value Ref Range Status   04/17/2025 9 8 - 16 mmol/L Final     eGFR   Date Value Ref Range Status   04/17/2025 >60 >60 mL/min/1.73/m2 Final     Comment:     Estimated GFR calculated using the CKD-EPI creatinine (2021) equation.   10/03/2024 >60.0 >60 mL/min/1.73 m^2 Final      Lab Results   Component Value Date    WBC 6.93 04/17/2025    RBC 4.46 04/17/2025    HGB 13.5 04/17/2025    HCT 41.6 04/17/2025    MCV 93 04/17/2025    MCH 30.3 04/17/2025    MCHC 32.5  "04/17/2025    RDW 12.5 04/17/2025     04/17/2025    MPV 9.3 04/17/2025    GRAN 6.1 10/03/2024    GRAN 71.0 10/03/2024    LYMPH 25.7 04/17/2025    LYMPH 1.78 04/17/2025    MONO 10.2 04/17/2025    MONO 0.71 04/17/2025    EOS 1.3 04/17/2025    EOS 0.09 04/17/2025    BASO 0.05 10/03/2024    EOSINOPHIL 0.8 10/03/2024    BASOPHIL 0.7 04/17/2025    BASOPHIL 0.05 04/17/2025      Lab Results   Component Value Date    TSH 0.621 10/03/2024     Lab Results   Component Value Date    HGBA1C 5.7 (H) 04/17/2025      Urine Microalbumin   Date Value Ref Range Status   04/17/2025 6.0   ug/mL Final     Microalbumin, Urine   Date Value Ref Range Status   01/12/2024 <5.0 ug/mL Final     eGFR   Date Value Ref Range Status   04/17/2025 >60 >60 mL/min/1.73/m2 Final     Comment:     Estimated GFR calculated using the CKD-EPI creatinine (2021) equation.   10/03/2024 >60.0 >60 mL/min/1.73 m^2 Final   08/27/2024 >60.0 >60 mL/min/1.73 m^2 Final     Lab Results   Component Value Date    CHOL 186 01/12/2024      Lab Results   Component Value Date    HDL 46 01/12/2024      No results found for: "LDL" LDL-C 121  Lab Results   Component Value Date    TRIG 95 01/12/2024      No results found for: "DLDL"       Lab Results   Component Value Date    CHOLHDL 24.7 01/12/2024 4/2025 urine negative for microalbuminuria    Last Echo:   No results found for this or any previous visit.    Last stress test:   No results found for this or any previous visit.     Cardiovascular angiogram:  none  ECG: NSR with frequent PVCs, rate 76, TWA  Fundoscopic exam: frequent, have glaucoma    WF referred for HTN and pre-op for eye shunt operation. Negative past CV history and no premature family history for CAD nor CVA. Denies any CV symptom. Able to remain fairly active without problem. Overall ASCVD 10-year event risk of 18.2%, not on statin.    Cosme Ketih MD noted 4/17/2025 "PVC (premature ventricular contraction)  Essential hypertension, " "benign  Overview:  Elevated     Assessment & Plan:  Patient related that to stress of eye surgery  She declined inc her losartan  Ref to Card  Dec caffeine and diet modif    VS - /90 Important   (BP Location: Left arm, Patient Position: Sitting)     Pulse 66     Ht 5' 3" (1.6 m)     Wt 60.1 kg (132 lb 8 oz)     SpO2 98%     BMI 23.47 kg/m²     BSA 1.63 m²     Pain Sc 0-No pain        Review of Systems   Constitutional: Negative for diaphoresis, fever, malaise/fatigue, night sweats and weight gain.   HENT:  Negative for hearing loss, nosebleeds and tinnitus.    Eyes:  Positive for blurred vision, photophobia and redness. Negative for discharge and visual disturbance.   Cardiovascular:  Negative for chest pain, claudication, cyanosis, dyspnea on exertion, irregular heartbeat, leg swelling, near-syncope, orthopnea, palpitations and paroxysmal nocturnal dyspnea.   Respiratory:  Negative for cough, shortness of breath, sleep disturbances due to breathing, snoring and wheezing.         [place] score 2, awaken refreshed.   Endocrine: Negative for polydipsia and polyuria.   Hematologic/Lymphatic: Does not bruise/bleed easily.   Skin:  Negative for color change, flushing, nail changes, poor wound healing and suspicious lesions.   Musculoskeletal:  Positive for joint pain and myalgias. Negative for arthritis, falls, gout, joint swelling, muscle cramps, muscle weakness and neck pain.   Gastrointestinal:  Negative for constipation, diarrhea, heartburn, hematemesis, hematochezia, melena, nausea and vomiting.   Genitourinary:  Negative for dysuria and hematuria.   Neurological:  Negative for disturbances in coordination, excessive daytime sleepiness, dizziness, focal weakness, headaches, light-headedness, loss of balance, numbness, vertigo and weakness.   Psychiatric/Behavioral:  Positive for depression. Negative for substance abuse. The patient is nervous/anxious. The patient does not have insomnia.  " "  Allergic/Immunologic: Positive for environmental allergies.        Answers submitted by the patient for this visit:  Review of Systems Questionnaire (Submitted on 4/17/2025)  activity change: No  unexpected weight change: No  rhinorrhea: No  trouble swallowing: No  chest tightness: No  blood in stool: No  difficulty urinating: No  menstrual problem: No  arthralgias: Yes  confusion: No  dysphoric mood: No    Objective:    Physical Exam  Constitutional:       Appearance: She is well-developed.      Comments: RA O2 sat 98%  Orthostatic BP: sitting 156/85, standing 142/83   HENT:      Head: Normocephalic.   Eyes:      Conjunctiva/sclera: Conjunctivae normal.      Pupils: Pupils are equal, round, and reactive to light.   Neck:      Thyroid: No thyromegaly.      Vascular: No JVD.   Cardiovascular:      Rate and Rhythm: Normal rate. Rhythm irregular.      Pulses: Intact distal pulses.           Carotid pulses are 1+ on the right side and 1+ on the left side.       Radial pulses are 1+ on the right side and 1+ on the left side.        Popliteal pulses are 1+ on the right side.        Dorsalis pedis pulses are 1+ on the right side and 1+ on the left side.        Posterior tibial pulses are 1+ on the right side and 1+ on the left side.      Heart sounds: Normal heart sounds. No murmur heard.     No friction rub. No gallop.   Pulmonary:      Effort: Pulmonary effort is normal.      Breath sounds: Normal breath sounds. No rales.      Comments: Diminished breath sounds and prolong expiration.  Chest:      Chest wall: No tenderness.   Abdominal:      General: Bowel sounds are normal.      Palpations: Abdomen is soft.      Tenderness: There is no abdominal tenderness.      Comments: Waist 34"   Musculoskeletal:         General: Normal range of motion.      Cervical back: Normal range of motion and neck supple.   Lymphadenopathy:      Cervical: No cervical adenopathy.   Skin:     General: Skin is warm and dry.      Findings: No " rash.   Neurological:      Mental Status: She is alert and oriented to person, place, and time.           Assessment:       1. Primary hypertension    2. Stress    3. Rheumatoid arthritis of multiple sites with negative rheumatoid factor    4. Other specified glaucoma, unspecified laterality    5. White coat syndrome with diagnosis of hypertension    6. Prediabetes    7. Cardiovascular event risk, ASCVD 10-year risk 18.2%, 2024    8. Frequent PVCs    9. Dyslipidemia, baseline LDL-C 121    10. Preop cardiovascular exam         Plan:       Shell was seen today for hypertension and establish care.    Diagnoses and all orders for this visit:    Primary hypertension    Stress    Rheumatoid arthritis of multiple sites with negative rheumatoid factor    Other specified glaucoma, unspecified laterality    White coat syndrome with diagnosis of hypertension    Prediabetes    Cardiovascular event risk, ASCVD 10-year risk 18.2%, 2024  -     rosuvastatin (CRESTOR) 5 MG tablet; Take 1 tablet (5 mg total) by mouth once daily.  -     Lipid Panel; Future    Frequent PVCs    Dyslipidemia, baseline LDL-C 121  -     rosuvastatin (CRESTOR) 5 MG tablet; Take 1 tablet (5 mg total) by mouth once daily.  -     Lipid Panel; Future    Preop cardiovascular exam     - All medical issues reviewed, willing to start statin  - Clear for Surgery and anesthesia with acceptable risk from the cardiac standpoint. Eye operations are usually low risk.   - Warning signs of MI and stroke given, if symptoms last more than 5 minutes, stop immediately and call 911, then chew 2-4 low-dose ASA (81 mg).   - Consider use of Potassium chloride salt substitute, Camara Nu-Salt.    - CV status and all medications reviewed, patient acknowledge good understanding.  - Recommend healthy living: moderate alcohol, healthy diet and regular exercise aiming for fitness, and weight control  - Need good exercise program, 4 key elements: 1. Aerobic (walking, swimming, dancing,  jogging, biking, etc, 2. Muscle strengthening / resistance exercise, need to do 2-3 times weekly, 3. Stretching daily, good stretch takes a whole  total minute. 4. Balance exercise daily.   - Instruction for Mediterranean, high potassium diet and heart healthy exercise given.  - Recommend checking home blood pressure, 2 days weekly, do 2 readings within 5 minutes in AM and PM, keep log for review with provider. Target resting BP is less than 130/80.   - Highly recommend 30-60 minutes of exercise / activities daily, can have Sunday off, with 2-3 sessions of muscle strengthening weekly. A  would be very helpful.  - Recommend at least annual cardiovascular evaluation in view of patient's significant risk factors. Patient's preferene           Total time spend including review of record prior to face-to-face visit is 60 minutes. Greater than 50% of the time was spent in counseling and coordination of care. The above assessment and plan have been discussed at length. Referring provider's note reviewed. Labs and procedure over the last 6 months reviewed. Problem List updated. Asked to bring in all active medications / pills bottles with next visit. Will send note to referring / PCP.

## 2025-04-22 NOTE — LETTER
April 22, 2025      No Recipients           [facility]  [address]  [place] [address]  Phone: [phone]  Fax: [phone]          Patient: Shell Lopez   MR Number: [id number]   YOB: 1950   Date of Visit: 4/22/2025       Dear Dr. Cosme Keith:    Thank you for referring Shell Lopez to me for evaluation. Attached you will find relevant portions of my assessment and plan of care.    If you have questions, please do not hesitate to call me. I look forward to following Shell Lopez along with you.    Sincerely,    Rupesh Meadows MD    Enclosure  CC:  No Recipients    If you would like to receive this communication electronically, please contact [email or web address] or [phone] to request more information on "[employer] Link access.    For providers and/or their staff who would like to refer a patient to [facility], please contact us through our one-stop-shop provider referral line, [facility] , at [phone].    If you feel you have received this communication in error or would no longer like to receive these types of communications, please e-mail [email or web address]

## 2025-04-23 ENCOUNTER — TELEPHONE (OUTPATIENT)
Dept: CARDIOLOGY | Facility: CLINIC | Age: 75
End: 2025-04-23
Payer: MEDICARE

## 2025-04-23 ENCOUNTER — PATIENT MESSAGE (OUTPATIENT)
Dept: FAMILY MEDICINE | Facility: CLINIC | Age: 75
End: 2025-04-23
Payer: MEDICARE

## 2025-04-23 LAB
OHS QRS DURATION: 74 MS
OHS QTC CALCULATION: 398 MS

## 2025-04-23 NOTE — TELEPHONE ENCOUNTER
----- Message from Viraj sent at 4/23/2025  9:44 AM CDT -----  Regarding: return call  Contact: patient  Type:  Patient Returning CallWho Called:patientWho Left Message for Patient:nurseDoes the patient know what this is regarding?:paper work being signedWould the patient rather a call back or a response via [facility]? Best Call Back Number:[phone] Information:

## 2025-04-23 NOTE — TELEPHONE ENCOUNTER
Spoke with patient and Sruthi at Dr Bandar Mason office. Sruthi will fax over paperwork for cardiac clearance. Fax: [phone] or [phone]  Office: [phone] or [phone]

## 2025-04-25 ENCOUNTER — PATIENT MESSAGE (OUTPATIENT)
Dept: ADMINISTRATIVE | Facility: HOSPITAL | Age: 75
End: 2025-04-25
Payer: MEDICARE

## 2025-05-07 ENCOUNTER — LAB VISIT (OUTPATIENT)
Dept: LAB | Facility: CLINIC | Age: 75
End: 2025-05-07
Payer: MEDICARE

## 2025-05-07 ENCOUNTER — OFFICE VISIT (OUTPATIENT)
Dept: FAMILY MEDICINE | Facility: CLINIC | Age: 75
End: 2025-05-07
Payer: MEDICARE

## 2025-05-07 ENCOUNTER — RESULTS FOLLOW-UP (OUTPATIENT)
Dept: FAMILY MEDICINE | Facility: CLINIC | Age: 75
End: 2025-05-07

## 2025-05-07 VITALS
HEART RATE: 64 BPM | DIASTOLIC BLOOD PRESSURE: 80 MMHG | WEIGHT: 126.81 LBS | SYSTOLIC BLOOD PRESSURE: 138 MMHG | HEIGHT: 63 IN | BODY MASS INDEX: 22.47 KG/M2 | OXYGEN SATURATION: 98 %

## 2025-05-07 DIAGNOSIS — I10 WHITE COAT SYNDROME WITH DIAGNOSIS OF HYPERTENSION: ICD-10-CM

## 2025-05-07 DIAGNOSIS — M06.09 RHEUMATOID ARTHRITIS OF MULTIPLE SITES WITH NEGATIVE RHEUMATOID FACTOR: ICD-10-CM

## 2025-05-07 DIAGNOSIS — F32.4 MAJOR DEPRESSIVE DISORDER WITH SINGLE EPISODE, IN PARTIAL REMISSION: Primary | ICD-10-CM

## 2025-05-07 DIAGNOSIS — R94.31 ABNORMAL EKG: ICD-10-CM

## 2025-05-07 DIAGNOSIS — I10 PRIMARY HYPERTENSION: ICD-10-CM

## 2025-05-07 LAB
CHOLEST SERPL-MCNC: 164 MG/DL (ref 120–199)
CHOLEST/HDLC SERPL: 3.3 {RATIO} (ref 2–5)
HDLC SERPL-MCNC: 50 MG/DL (ref 40–75)
HDLC SERPL: 30.5 % (ref 20–50)
LDLC SERPL CALC-MCNC: 98 MG/DL (ref 63–159)
NONHDLC SERPL-MCNC: 114 MG/DL
TRIGL SERPL-MCNC: 80 MG/DL (ref 30–150)

## 2025-05-07 PROCEDURE — G2211 COMPLEX E/M VISIT ADD ON: HCPCS | Mod: S$GLB,,, | Performed by: FAMILY MEDICINE

## 2025-05-07 PROCEDURE — 99214 OFFICE O/P EST MOD 30 MIN: CPT | Mod: S$GLB,,, | Performed by: FAMILY MEDICINE

## 2025-05-07 PROCEDURE — 80061 LIPID PANEL: CPT

## 2025-05-07 PROCEDURE — 36415 COLL VENOUS BLD VENIPUNCTURE: CPT | Mod: ,,, | Performed by: FAMILY MEDICINE

## 2025-05-07 RX ORDER — ATROPINE SULFATE 10 MG/ML
SOLUTION/ DROPS OPHTHALMIC
COMMUNITY
Start: 2025-05-06

## 2025-05-07 RX ORDER — MOXIFLOXACIN 5 MG/ML
1 SOLUTION/ DROPS OPHTHALMIC 4 TIMES DAILY
COMMUNITY
Start: 2025-04-07

## 2025-05-07 NOTE — PATIENT INSTRUCTIONS
I think we can reduce stress about blood pressure  Get stress echo to check and make sure heart function is ok  Check cholesterol is ok    Follow up 3 months

## 2025-05-07 NOTE — PROGRESS NOTES
"    [facility]  [facility] - [place], [place]    Family Medicine Office Visit    Chief Complaint   Patient presents with    Follow-up        HPI:  followup - has had issues with elevated BP of late - has seen Dr. Meadows for this.  Has notable white coat component.  Has made recommendations for stress test.  This was based off of EKG obtained for pre-op clearance which suggested ischemia.  Now has stress echo pending.    Eye surgery is now done, and follow up in place    RA stable  BP elevated today  Mood stable on medication  Sugar slightly high    Still due for mammogram    Historically, we have deferred using statin given high level of inflammation and joint pain with RA    ROS: as above    Vitals:    05/07/25 0754 05/07/25 0838   BP: (!) 150/98 138/80   BP Location: Right arm    Patient Position: Sitting    Pulse: 64    SpO2: 98%    Weight: 57.5 kg (126 lb 12.8 oz)    Height: 5' 3" (1.6 m)       Body mass index is 22.46 kg/m².      General:  AOx3, well nourished and developed in no acute distress  Eyes:  PERRLA, EOMI, vision intact grossly  ENT:  normal hearing, moist oral mucosa  Neck:  trachea midline with no masses or thyromegaly  Heart:  RRR, no murmurs.  No edema noted, extremities warm and well perfused  Lungs:  clear to auscultation bilaterally with symmetric chest movement  Abdomen:  Soft, nontender, nondistended.  Normal bowel sounds  Musculoskeletal:  Normal gait.  Normal posture.  Normal muscular development with no joint swelling.  Neurological:  CN II-XII grossly intact. Symmetric strength and sensation  Psych:  Normal mood and affect.  Able to demonstrate good judgement and personal insight.      Assessment/Plan:    1. Major depressive disorder with single episode, in partial remission    2. Primary hypertension, dx 2017  Overview:  Elevated      3. White coat syndrome with diagnosis of hypertension    4. Rheumatoid arthritis of multiple sites with negative rheumatoid factor    5. " Abnormal EKG       Stable  At goal on repeat check  Contributing factor to presentation, improved.  Stable  Agree with pursuit of stress echo, especially given underlying RA.  We have historically declined statin given burden of inflammatory arthritis.  Check lipids today, and proceed with stress echo    Visit today included increased complexity associated with the care of the episodic problem ra, htn, abnormal ekg addressed and managing the longitudinal care of the patient due to the serious and/or complex managed problem(s) ra, htn, abnormal ekg.

## 2025-05-28 ENCOUNTER — PATIENT MESSAGE (OUTPATIENT)
Dept: FAMILY MEDICINE | Facility: CLINIC | Age: 75
End: 2025-05-28
Payer: MEDICARE

## 2025-07-14 ENCOUNTER — PATIENT MESSAGE (OUTPATIENT)
Dept: ADMINISTRATIVE | Facility: HOSPITAL | Age: 75
End: 2025-07-14
Payer: MEDICARE

## 2025-07-16 ENCOUNTER — PATIENT MESSAGE (OUTPATIENT)
Dept: FAMILY MEDICINE | Facility: CLINIC | Age: 75
End: 2025-07-16
Payer: MEDICARE

## 2025-07-24 ENCOUNTER — PATIENT MESSAGE (OUTPATIENT)
Dept: ADMINISTRATIVE | Facility: HOSPITAL | Age: 75
End: 2025-07-24
Payer: MEDICARE

## 2025-07-29 PROCEDURE — 82465 ASSAY BLD/SERUM CHOLESTEROL: CPT | Performed by: INTERNAL MEDICINE

## 2025-08-08 ENCOUNTER — OFFICE VISIT (OUTPATIENT)
Dept: FAMILY MEDICINE | Facility: CLINIC | Age: 75
End: 2025-08-08
Payer: MEDICARE

## 2025-08-08 VITALS
SYSTOLIC BLOOD PRESSURE: 115 MMHG | OXYGEN SATURATION: 99 % | HEART RATE: 77 BPM | WEIGHT: 127.19 LBS | BODY MASS INDEX: 22.54 KG/M2 | DIASTOLIC BLOOD PRESSURE: 89 MMHG | HEIGHT: 63 IN

## 2025-08-08 DIAGNOSIS — R73.03 PREDIABETES: ICD-10-CM

## 2025-08-08 DIAGNOSIS — I10 PRIMARY HYPERTENSION: Primary | ICD-10-CM

## 2025-08-08 DIAGNOSIS — M06.09 RHEUMATOID ARTHRITIS OF MULTIPLE SITES WITH NEGATIVE RHEUMATOID FACTOR: ICD-10-CM

## 2025-08-08 DIAGNOSIS — I10 WHITE COAT SYNDROME WITH DIAGNOSIS OF HYPERTENSION: ICD-10-CM

## 2025-08-08 PROCEDURE — 86140 C-REACTIVE PROTEIN: CPT | Performed by: FAMILY MEDICINE

## 2025-08-08 PROCEDURE — 85651 RBC SED RATE NONAUTOMATED: CPT | Performed by: FAMILY MEDICINE

## 2025-08-08 NOTE — PROGRESS NOTES
"    [facility]  [facility] - [place], [place]    Family Medicine Office Visit    Chief Complaint   Patient presents with    Health Maintenance        HPI:  routine followup  Blood Pressure at goal on medication, with patient reporting prescription compliance  RA stable with aggressive lifestyle modification, on low level prednisone  Impaired fasting glucose stable    ROS: as above    Vitals:    08/08/25 0901   BP: 115/89   BP Location: Left arm   Patient Position: Sitting   Pulse: 77   SpO2: 99%   Weight: 57.7 kg (127 lb 3.3 oz)   Height: 5' 3" (1.6 m)      Body mass index is 22.53 kg/m².      General:  AOx3, well nourished and developed in no acute distress  Eyes:  PERRLA, EOMI, vision intact grossly  ENT:  normal hearing, moist oral mucosa  Neck:  trachea midline with no masses or thyromegaly  Heart:  RRR, no murmurs.  No edema noted, extremities warm and well perfused  Lungs:  clear to auscultation bilaterally with symmetric chest movement  Abdomen:  Soft, nontender, nondistended.  Normal bowel sounds  Musculoskeletal:  Normal gait.  Normal posture.  Normal muscular development with no joint swelling.  Neurological:  CN II-XII grossly intact. Symmetric strength and sensation  Psych:  Normal mood and affect.  Able to demonstrate good judgement and personal insight.      Assessment/Plan:    1. Primary hypertension, dx 2017    2. White coat syndrome with diagnosis of hypertension    3. Rheumatoid arthritis of multiple sites with negative rheumatoid factor    4. Prediabetes       At goal, continue management  Stable  Stable on low dose prednisone  Stable, advised diet change    Visit today included increased complexity associated with the care of the episodic problem RA, HTN, pre dm addressed and managing the longitudinal care of the patient due to the serious and/or complex managed problem(s) RA, htn, pre dm.        "